# Patient Record
Sex: MALE | Race: WHITE | NOT HISPANIC OR LATINO | Employment: OTHER | URBAN - METROPOLITAN AREA
[De-identification: names, ages, dates, MRNs, and addresses within clinical notes are randomized per-mention and may not be internally consistent; named-entity substitution may affect disease eponyms.]

---

## 2017-01-30 ENCOUNTER — HOSPITAL ENCOUNTER (EMERGENCY)
Facility: HOSPITAL | Age: 27
Discharge: LEFT BEFORE DISCHARGE | End: 2017-01-30

## 2017-01-30 VITALS
BODY MASS INDEX: 23.62 KG/M2 | TEMPERATURE: 99 F | WEIGHT: 194 LBS | OXYGEN SATURATION: 100 % | SYSTOLIC BLOOD PRESSURE: 112 MMHG | HEIGHT: 76 IN | DIASTOLIC BLOOD PRESSURE: 80 MMHG | HEART RATE: 70 BPM | RESPIRATION RATE: 18 BRPM

## 2017-01-30 PROCEDURE — 93005 ELECTROCARDIOGRAM TRACING: CPT

## 2017-01-30 PROCEDURE — 99900041 HC LEFT WITHOUT BEING SEEN- EMERGENCY

## 2017-04-04 ENCOUNTER — HOSPITAL ENCOUNTER (EMERGENCY)
Facility: HOSPITAL | Age: 27
Discharge: HOME | End: 2017-04-04
Attending: EMERGENCY MEDICINE
Payer: MEDICAID

## 2017-04-04 VITALS
BODY MASS INDEX: 23.75 KG/M2 | OXYGEN SATURATION: 98 % | HEART RATE: 61 BPM | SYSTOLIC BLOOD PRESSURE: 125 MMHG | DIASTOLIC BLOOD PRESSURE: 84 MMHG | HEIGHT: 76 IN | WEIGHT: 195 LBS | TEMPERATURE: 98 F | RESPIRATION RATE: 16 BRPM

## 2017-04-04 DIAGNOSIS — R10.13 EPIGASTRIC PAIN: Primary | ICD-10-CM

## 2017-04-04 DIAGNOSIS — R06.02 SHORTNESS OF BREATH: ICD-10-CM

## 2017-04-04 DIAGNOSIS — J18.9 PNEUMONIA OF RIGHT LUNG DUE TO INFECTIOUS ORGANISM, UNSPECIFIED PART OF LUNG: ICD-10-CM

## 2017-04-04 LAB
ALBUMIN SERPL BCP-MCNC: 4.1 G/DL
ALP SERPL-CCNC: 70 U/L
ALT SERPL W/O P-5'-P-CCNC: 18 U/L
AMORPH CRY URNS QL MICRO: NORMAL
ANION GAP SERPL CALC-SCNC: 7 MMOL/L
AST SERPL-CCNC: 21 U/L
BASOPHILS # BLD AUTO: 0.05 K/UL
BASOPHILS NFR BLD: 0.4 %
BILIRUB SERPL-MCNC: 0.5 MG/DL
BILIRUB UR QL STRIP: NEGATIVE
BUN SERPL-MCNC: 13 MG/DL
CALCIUM SERPL-MCNC: 9.5 MG/DL
CHLORIDE SERPL-SCNC: 104 MMOL/L
CLARITY UR: ABNORMAL
CO2 SERPL-SCNC: 28 MMOL/L
COLOR UR: YELLOW
CREAT SERPL-MCNC: 1.2 MG/DL
DIFFERENTIAL METHOD: ABNORMAL
EOSINOPHIL # BLD AUTO: 0.8 K/UL
EOSINOPHIL NFR BLD: 6.5 %
ERYTHROCYTE [DISTWIDTH] IN BLOOD BY AUTOMATED COUNT: 13.6 %
EST. GFR  (AFRICAN AMERICAN): >60 ML/MIN/1.73 M^2
EST. GFR  (NON AFRICAN AMERICAN): >60 ML/MIN/1.73 M^2
GLUCOSE SERPL-MCNC: 92 MG/DL
GLUCOSE UR QL STRIP: NEGATIVE
HCT VFR BLD AUTO: 43.4 %
HGB BLD-MCNC: 14.6 G/DL
HGB UR QL STRIP: NEGATIVE
KETONES UR QL STRIP: NEGATIVE
LEUKOCYTE ESTERASE UR QL STRIP: NEGATIVE
LIPASE SERPL-CCNC: 32 U/L
LYMPHOCYTES # BLD AUTO: 4.1 K/UL
LYMPHOCYTES NFR BLD: 34.3 %
MCH RBC QN AUTO: 26.3 PG
MCHC RBC AUTO-ENTMCNC: 33.6 %
MCV RBC AUTO: 78 FL
MICROSCOPIC COMMENT: NORMAL
MONOCYTES # BLD AUTO: 1.2 K/UL
MONOCYTES NFR BLD: 9.8 %
NEUTROPHILS # BLD AUTO: 5.8 K/UL
NEUTROPHILS NFR BLD: 48.7 %
NITRITE UR QL STRIP: NEGATIVE
PH UR STRIP: 8 [PH] (ref 5–8)
PLATELET # BLD AUTO: 161 K/UL
PMV BLD AUTO: 11.4 FL
POTASSIUM SERPL-SCNC: 4.4 MMOL/L
PROT SERPL-MCNC: 7 G/DL
PROT UR QL STRIP: NEGATIVE
RBC # BLD AUTO: 5.56 M/UL
RBC #/AREA URNS HPF: 0 /HPF (ref 0–4)
SODIUM SERPL-SCNC: 139 MMOL/L
SP GR UR STRIP: 1.01 (ref 1–1.03)
URN SPEC COLLECT METH UR: ABNORMAL
UROBILINOGEN UR STRIP-ACNC: NEGATIVE EU/DL
WBC # BLD AUTO: 12 K/UL
WBC #/AREA URNS HPF: 0 /HPF (ref 0–5)

## 2017-04-04 PROCEDURE — 85025 COMPLETE CBC W/AUTO DIFF WBC: CPT

## 2017-04-04 PROCEDURE — 63600175 PHARM REV CODE 636 W HCPCS: Performed by: PHYSICIAN ASSISTANT

## 2017-04-04 PROCEDURE — 96374 THER/PROPH/DIAG INJ IV PUSH: CPT

## 2017-04-04 PROCEDURE — 96375 TX/PRO/DX INJ NEW DRUG ADDON: CPT

## 2017-04-04 PROCEDURE — 80053 COMPREHEN METABOLIC PANEL: CPT

## 2017-04-04 PROCEDURE — 99285 EMERGENCY DEPT VISIT HI MDM: CPT | Mod: 25

## 2017-04-04 PROCEDURE — 25000003 PHARM REV CODE 250: Performed by: PHYSICIAN ASSISTANT

## 2017-04-04 PROCEDURE — 25500020 PHARM REV CODE 255: Performed by: EMERGENCY MEDICINE

## 2017-04-04 PROCEDURE — 93005 ELECTROCARDIOGRAM TRACING: CPT

## 2017-04-04 PROCEDURE — 83690 ASSAY OF LIPASE: CPT

## 2017-04-04 PROCEDURE — 81000 URINALYSIS NONAUTO W/SCOPE: CPT

## 2017-04-04 RX ORDER — ONDANSETRON 2 MG/ML
4 INJECTION INTRAMUSCULAR; INTRAVENOUS
Status: COMPLETED | OUTPATIENT
Start: 2017-04-04 | End: 2017-04-04

## 2017-04-04 RX ORDER — DOXYCYCLINE HYCLATE 100 MG
100 TABLET ORAL
Status: COMPLETED | OUTPATIENT
Start: 2017-04-04 | End: 2017-04-04

## 2017-04-04 RX ORDER — DOXYCYCLINE 100 MG/1
100 CAPSULE ORAL EVERY 12 HOURS
Qty: 20 CAPSULE | Refills: 0 | Status: SHIPPED | OUTPATIENT
Start: 2017-04-04 | End: 2017-04-04 | Stop reason: ALTCHOICE

## 2017-04-04 RX ORDER — AZITHROMYCIN 500 MG/1
500 TABLET, FILM COATED ORAL DAILY
Qty: 5 TABLET | Refills: 0 | Status: SHIPPED | OUTPATIENT
Start: 2017-04-04 | End: 2017-04-09

## 2017-04-04 RX ORDER — PANTOPRAZOLE SODIUM 40 MG/1
40 TABLET, DELAYED RELEASE ORAL DAILY
Qty: 30 TABLET | Refills: 0 | Status: SHIPPED | OUTPATIENT
Start: 2017-04-04 | End: 2018-07-20

## 2017-04-04 RX ORDER — HYDROMORPHONE HYDROCHLORIDE 2 MG/ML
0.25 INJECTION, SOLUTION INTRAMUSCULAR; INTRAVENOUS; SUBCUTANEOUS
Status: COMPLETED | OUTPATIENT
Start: 2017-04-04 | End: 2017-04-04

## 2017-04-04 RX ORDER — ONDANSETRON 4 MG/1
4 TABLET, ORALLY DISINTEGRATING ORAL EVERY 8 HOURS PRN
Qty: 9 TABLET | Refills: 0 | Status: SHIPPED | OUTPATIENT
Start: 2017-04-04 | End: 2018-07-20

## 2017-04-04 RX ADMIN — HYDROMORPHONE HYDROCHLORIDE 0.25 MG: 2 INJECTION INTRAMUSCULAR; INTRAVENOUS; SUBCUTANEOUS at 01:04

## 2017-04-04 RX ADMIN — IOHEXOL 80 ML: 350 INJECTION, SOLUTION INTRAVENOUS at 02:04

## 2017-04-04 RX ADMIN — ONDANSETRON 4 MG: 2 INJECTION INTRAMUSCULAR; INTRAVENOUS at 01:04

## 2017-04-04 RX ADMIN — DOXYCYCLINE HYCLATE 100 MG: 100 TABLET, COATED ORAL at 04:04

## 2017-04-04 NOTE — ED AVS SNAPSHOT
[facility]  [place] [address]               Stephanie Singh   2017 12:51 AM   ED    Description:  Male : 1990   Department:  [facility]           Your Care was Coordinated By:     Provider Role From To    Caitlyn Forte MD Attending Provider 17 0059 --    EDITH Randolph Physician Assistant 17 0058 --      Reason for Visit     Abdominal Pain           Diagnoses this Visit        Comments    Epigastric pain    -  Primary     Shortness of breath         Pneumonia of right lung due to infectious organism, unspecified part of lung           ED Disposition     ED Disposition Condition Comment    Discharge             To Do List           Follow-up Information     Follow up with Gustavo Huggins MD.    Specialty:  Internal Medicine    Why:  Follow up with primary care within 3 days.  Call to schedule an appointment.    Contact information:    [address] [facility] [address] [phone]          Follow up with Nic Deal MD.    Specialty:  Gastroenterology    Why:  Follow up with gastroenterology within 2 days.  Call to schedule an appointment.    Contact information:    [address] [place] [address]  [address]  [facility]  [place] [address] [phone]         These Medications        Disp Refills Start End    pantoprazole (PROTONIX) 40 MG tablet 30 tablet 0 2017    Take 1 tablet (40 mg total) by mouth once daily. - Oral    ondansetron (ZOFRAN-ODT) 4 MG TbDL 9 tablet 0 2017     Take 1 tablet (4 mg total) by mouth every 8 (eight) hours as needed (nausea/vomiting). - Oral    azithromycin (ZITHROMAX) 500 MG tablet 5 tablet 0 2017    Take 1 tablet (500 mg total) by mouth once daily. - Oral      [facility]     [facility] -  Assistance  Unless otherwise directed by your provider, please contact [facility], our  nurse care line that is available for 24/7 assistance.     Registered nurses in the [facility] provide: appointment scheduling, clinical advisement, health education, and other advisory services.  Call: [phone] (toll free)               Medications           Message regarding Medications     Verify the changes and/or additions to your medication regime listed below are the same as discussed with your clinician today.  If any of these changes or additions are incorrect, please notify your healthcare provider.        START taking these NEW medications        Refills    pantoprazole (PROTONIX) 40 MG tablet 0    Sig: Take 1 tablet (40 mg total) by mouth once daily.    Class: Print    Route: Oral    ondansetron (ZOFRAN-ODT) 4 MG TbDL 0    Sig: Take 1 tablet (4 mg total) by mouth every 8 (eight) hours as needed (nausea/vomiting).    Class: Print    Route: Oral    azithromycin (ZITHROMAX) 500 MG tablet 0    Sig: Take 1 tablet (500 mg total) by mouth once daily.    Class: Print    Route: Oral      These medications were administered today        Dose Freq    ondansetron injection 4 mg 4 mg ED 1 Time    Sig: Inject 4 mg into the vein ED 1 Time.    Class: Normal    Route: Intravenous    hydromorphone (PF) injection 0.25 mg 0.25 mg ED 1 Time    Sig: Inject 0.125 mLs (0.25 mg total) into the vein ED 1 Time.    Class: Normal    Route: Intravenous    omnipaque 350 iohexol 80 mL 80 mL IMG once as needed    Sig: Inject 80 mLs into the vein ONCE PRN for contrast.    Class: Normal    Route: Intravenous    doxycycline tablet 100 mg 100 mg ED 1 Time    Sig: Take 1 tablet (100 mg total) by mouth ED 1 Time.    Class: Normal    Route: Oral           Verify that the below list of medications is an accurate representation of the medications you are currently taking.  If none reported, the list may be blank. If incorrect, please contact your healthcare provider. Carry this list with you in case of emergency.          "  Current Medications     azithromycin (ZITHROMAX) 500 MG tablet Take 1 tablet (500 mg total) by mouth once daily.    ondansetron (ZOFRAN-ODT) 4 MG TbDL Take 1 tablet (4 mg total) by mouth every 8 (eight) hours as needed (nausea/vomiting).    pantoprazole (PROTONIX) 40 MG tablet Take 1 tablet (40 mg total) by mouth once daily.           Clinical Reference Information           Your Vitals Were     BP Pulse Temp Resp Height Weight    125/84 (BP Location: Left arm, Patient Position: Sitting, BP Method: Automatic) 61 98.2 °F (36.8 °C) (Oral) 16 6' 4" (1.93 m) 88.5 kg (195 lb)    SpO2 BMI             98% 23.74 kg/m2         Allergies as of 4/4/2017     No Known Allergies      Immunizations Administered on Date of Encounter - 4/4/2017     None      ED Micro, Lab, POCT     Start Ordered       Status Ordering Provider    04/04/17 0129 04/04/17 0129  CBC W/ AUTO DIFFERENTIAL  Once      Final result     04/04/17 0129 04/04/17 0129  Comp. Metabolic Panel  STAT      Final result     04/04/17 0129 04/04/17 0129  Lipase  STAT      Final result     04/04/17 0129 04/04/17 0129  Urinalysis - Clean Catch  STAT      Final result     04/04/17 0129 04/04/17 0129  Urinalysis Microscopic  Once      Final result       ED Imaging Orders     Start Ordered       Status Ordering Provider    04/04/17 0130 04/04/17 0129  CT Abdomen Pelvis With Contrast  1 time imaging      Final result     04/04/17 0129 04/04/17 0129  X-ray chest PA and lateral  1 time imaging     Comments:  Abdominal pain and shortness of breath    Final result         Discharge Instructions       The patient is discharged to home.  You are to follow up as directed above.  Rest.  Avoid: spicy/fatty/greasy foods, alcohol, caffeine, citrus, NSAID medications, chocolate.  Return to the ED immediately for any new or worsening symptoms: fever, increased pain, worsening shortness of breath, chest pain, black or bloody vomit/stool, weakness, dizziness, or any other " concerns.    Discharge References/Attachments     ADULT, PNEUMONIA (ENGLISH)    ABDOMINAL PAIN, UNKNOWN CAUSE, (MALE) (ENGLISH)    SHORTNESS OF BREATH (DYSPNEA) (ENGLISH)      [facility] Sign-Up     Activating your [facility] account is as easy as 1-2-3!     1) Visit [email or web address], select Sign Up Now, enter this activation code and your date of birth, then select Next.  [email or web address]  Expires: 5/19/2017  4:01 AM      2) Create a username and password to use when you visit [facility] in the future and select a security question in case you lose your password and select Next.    3) Enter your e-mail address and click Sign Up!    Additional Information  If you have questions, please e-mail [email or web address] or call [phone] to talk to our [facility] staff. Remember, [facility] is NOT to be used for urgent needs. For medical emergencies, dial 911.         Smoking Cessation     If you would like to quit smoking:   You may be eligible for free services if you are a [place] resident and started smoking cigarettes before September 1, 1988.  Call the Smoking Cessation Trust ([facility]) toll free at [phone] or [phone].   Call 1-800-QUIT-NOW if you do not meet the above criteria.   Contact us via email: [email or web address]   View our website for more information: [email or web address]         [facility] complies with applicable Federal civil rights laws and does not discriminate on the basis of race, color, national origin, age, disability, or sex.        Language Assistance Services     ATTENTION: Language assistance services are available, free of charge. Please call [phone].      ATENCIÓN: Si habla español, tiene a adams disposición servicios gratuitos de asistencia lingüística. Llame al [phone].     CHÚ Ý: N?u b?n nói Ti?ng Vi?t, có các d?ch v? h? tr? ngôn ng? mi?n phí dành cho b?n. G?i s? [phone].

## 2017-04-04 NOTE — ED PROVIDER NOTES
Encounter Date: 4/4/2017       History     Chief Complaint   Patient presents with    Abdominal Pain     Pt states for the last two days he feels full and bloated and has reflux     Review of patient's allergies indicates:  No Known Allergies  HPI Comments: Historian:  Patient and wife, through the aid of Yazanchris onofre # [id number]  CC:  Shortness of breath  HPI:  This 26-year-old male presents to the emergency department complaining of a 10 month history of shortness of breath.  He has associated upper abdominal pain that radiates into his chest, nausea, and abdominal bloating.  He states his symptoms are aggravated by eating and drinking.  He has had some relief with Nexium and Gas-X, however these medications do not seem to be working as well as they initially were.  His pain is 7/10.  The patient denies fever, cough, black or bloody vomit or stool, constipation, vomiting, diarrhea, dysuria, hematuria, and increased urinary frequency.    History reviewed. No pertinent past medical history.  History reviewed. No pertinent surgical history.  History reviewed. No pertinent family history.  Social History   Substance Use Topics    Smoking status: Current Every Day Smoker     Packs/day: 1.00     Years: 15.00     Types: Cigarettes    Smokeless tobacco: None    Alcohol use Yes     Review of Systems   Constitutional: Negative for fever.   HENT: Negative for congestion and trouble swallowing.    Respiratory: Positive for shortness of breath. Negative for cough.    Cardiovascular: Positive for chest pain.   Gastrointestinal: Positive for abdominal pain and nausea. Negative for diarrhea and vomiting.   Genitourinary: Negative for dysuria, frequency and hematuria.   Musculoskeletal: Negative for gait problem.   Skin: Negative for rash.   Allergic/Immunologic: Negative for immunocompromised state.   Neurological: Negative for seizures.   Psychiatric/Behavioral: Negative for confusion.       Physical Exam   Initial Vitals    BP Pulse Resp Temp SpO2   04/04/17 0004 04/04/17 0004 04/04/17 0004 04/04/17 0004 04/04/17 0004   138/71 94 18 97.9 °F (36.6 °C) 98 %     Physical Exam    Constitutional: He appears well-developed and well-nourished. He is cooperative.  Non-toxic appearance. No distress.   HENT:   Head: Normocephalic and atraumatic.   Mouth/Throat: Mucous membranes are normal. No trismus in the jaw.   Neck: Trachea normal, normal range of motion, full passive range of motion without pain and phonation normal. Neck supple. No stridor present. No rigidity.   No meningismus.   Cardiovascular: Normal rate, regular rhythm and normal heart sounds. Exam reveals no gallop.    Pulmonary/Chest: Effort normal and breath sounds normal. No tachypnea. No respiratory distress. He has no decreased breath sounds. He has no wheezes. He has no rhonchi. He has no rales.   Abdominal: Soft. Normal appearance and bowel sounds are normal. There is tenderness in the epigastric area. There is no rigidity, no rebound, no guarding, no CVA tenderness, no tenderness at McBurney's point and negative Arguelles's sign.   Neurological: He is alert and oriented to person, place, and time. He has normal strength. No sensory deficit.   Skin: Skin is warm, dry and intact. No rash noted.         ED Course   Procedures  Labs Reviewed   CBC W/ AUTO DIFFERENTIAL - Abnormal; Notable for the following:        Result Value    MCV 78 (*)     MCH 26.3 (*)     Mono # 1.2 (*)     Eos # 0.8 (*)     All other components within normal limits   COMPREHENSIVE METABOLIC PANEL - Abnormal; Notable for the following:     Anion Gap 7 (*)     All other components within normal limits   URINALYSIS - Abnormal; Notable for the following:     Appearance, UA Cloudy (*)     All other components within normal limits   LIPASE   URINALYSIS MICROSCOPIC     EKG Readings: (Independently Interpreted)   01:33: Normal sinus rhythm, heart rate 68.  R axis.  Normal intervals.  No STEMI.             Additional  MDM:   Comments: Patient with a 10 month history of shortness of breath, chest pain, and epigastric abdominal pain.  He is afebrile and nontoxic-appearing with a supple neck and no meningismus.  His lungs are clear to auscultation bilaterally and he is not hypoxic or in any respiratory distress.  He has epigastric tenderness to palpation.  Urinalysis negative for blood and infection.  CBC unremarkable - no leukocytosis or significant anemia.  CMP unremarkable - no renal insufficiency, transaminitis, or significant electrolyte imbalance.  Lipase is within normal limits.  EKG independently reviewed and interpreted by Dr. Forte as normal sinus rhythm without ischemia or malignant arrhythmia.  Chest radiographs independently reviewed and interpreted by Dr. Forte and myself as no pleural effusion, cardiomegaly, or pneumothorax.  Radiology comments on possible nodular opacity in the right lung concerning for pneumonia.  Patient does not have a cough and his symptoms have been present for 10 months.  Will treat with azithromycin for possible pneumonia.  CT scan of abdomen and pelvis does not demonstrate evidence of bowel obstruction, bowel perforation, pancreas abnormality, gallbladder abnormality, appendicitis, intra-abdominal abscess, diverticulitis.  Patient did improve in the emergency department with IV fluids, Zofran, and Dilaudid.  I doubt meningitis, sepsis, pulmonary embolism, acute coronary syndrome, pancreatitis.  He could have ulcer disease.  Will start on Protonix.  Will prescribe Zofran.  He is to closely follow-up with primary care and gastroenterology.  Careful ED warnings and return instructions given.  This patient's case was discussed with Dr. Forte, she is in agreement with the assessment and plan..            Attending Attestation:     Physician Attestation Statement for NP/PA:   I discussed this assessment and plan of this patient with the NP/PA, but I did not personally examine the  patient. The face to face encounter was performed by the NP/PA.    Other NP/PA Attestation Additions:    History of Present Illness: 26-year-old male with 10 months of cough, chest pain, epigastric pain, bloating.   Physical Exam: Vital stable.   Medical Decision Making: Differential broad including atypical infectious disease, gastritis, pancreatitis, peptic ulcer, other.  ED workup overall reassuring.  CBC within normal.  CMP and lipase within normal.  UA without infection.  Chest x-ray read by radiology as questionable nodular opacities in the right lung, overall unimpressive, but treating with azithromycin.  CT abdomen and pelvis are also reassuring.  Patient will be discharged on Protonix and Zofran with antibiotics.  Follow-up to PMD encouraged.                 ED Course     Clinical Impression:   The primary encounter diagnosis was Epigastric pain. Diagnoses of Shortness of breath and Pneumonia of right lung due to infectious organism, unspecified part of lung were also pertinent to this visit.          EDITH Randolph  04/04/17 0603       EDITH Randolph  04/04/17 0604       Caitlyn Forte MD  04/04/17 0632

## 2017-04-04 NOTE — ED TRIAGE NOTES
Through the Patients wife, Demetris, states that he has very severe bloating with sob and cp. Also, pt feels constipated and states that the last time a bm was this morning. Pt has taken gas x and nexium to help relieve his pain and bloating.   Patient states to have  Nausea but denies vomiting.  Pt  States this has been going on since July.

## 2017-04-04 NOTE — DISCHARGE INSTRUCTIONS
The patient is discharged to home.  You are to follow up as directed above.  Rest.  Avoid: spicy/fatty/greasy foods, alcohol, caffeine, citrus, NSAID medications, chocolate.  Return to the ED immediately for any new or worsening symptoms: fever, increased pain, worsening shortness of breath, chest pain, black or bloody vomit/stool, weakness, dizziness, or any other concerns.

## 2018-07-20 ENCOUNTER — HOSPITAL ENCOUNTER (OUTPATIENT)
Facility: HOSPITAL | Age: 28
Discharge: HOME | End: 2018-07-21
Attending: EMERGENCY MEDICINE | Admitting: SURGERY

## 2018-07-20 DIAGNOSIS — K35.80 ACUTE APPENDICITIS, UNSPECIFIED ACUTE APPENDICITIS TYPE: Primary | ICD-10-CM

## 2018-07-20 LAB
ABO + RH BLD: NORMAL
ALBUMIN SERPL BCP-MCNC: 4.6 G/DL
ALP SERPL-CCNC: 90 U/L
ALT SERPL W/O P-5'-P-CCNC: 12 U/L
ANION GAP SERPL CALC-SCNC: 11 MMOL/L
AST SERPL-CCNC: 17 U/L
BASOPHILS # BLD AUTO: 0.01 K/UL
BASOPHILS NFR BLD: 0.1 %
BILIRUB SERPL-MCNC: 0.8 MG/DL
BLD GP AB SCN CELLS X3 SERPL QL: NORMAL
BUN SERPL-MCNC: 11 MG/DL
CALCIUM SERPL-MCNC: 10 MG/DL
CHLORIDE SERPL-SCNC: 103 MMOL/L
CO2 SERPL-SCNC: 25 MMOL/L
CREAT SERPL-MCNC: 1.1 MG/DL
DIFFERENTIAL METHOD: ABNORMAL
EOSINOPHIL # BLD AUTO: 0.2 K/UL
EOSINOPHIL NFR BLD: 1.8 %
ERYTHROCYTE [DISTWIDTH] IN BLOOD BY AUTOMATED COUNT: 14 %
EST. GFR  (AFRICAN AMERICAN): >60 ML/MIN/1.73 M^2
EST. GFR  (NON AFRICAN AMERICAN): >60 ML/MIN/1.73 M^2
GLUCOSE SERPL-MCNC: 92 MG/DL
HCT VFR BLD AUTO: 45.4 %
HGB BLD-MCNC: 15.6 G/DL
LIPASE SERPL-CCNC: 12 U/L
LYMPHOCYTES # BLD AUTO: 2.1 K/UL
LYMPHOCYTES NFR BLD: 15.8 %
MCH RBC QN AUTO: 26.9 PG
MCHC RBC AUTO-ENTMCNC: 34.4 G/DL
MCV RBC AUTO: 78 FL
MONOCYTES # BLD AUTO: 1.4 K/UL
MONOCYTES NFR BLD: 10.5 %
NEUTROPHILS # BLD AUTO: 9.3 K/UL
NEUTROPHILS NFR BLD: 71.2 %
PLATELET # BLD AUTO: 200 K/UL
PMV BLD AUTO: 11.9 FL
POTASSIUM SERPL-SCNC: 3.9 MMOL/L
PROT SERPL-MCNC: 8.2 G/DL
RBC # BLD AUTO: 5.81 M/UL
SODIUM SERPL-SCNC: 139 MMOL/L
WBC # BLD AUTO: 13.03 K/UL

## 2018-07-20 PROCEDURE — 86850 RBC ANTIBODY SCREEN: CPT

## 2018-07-20 PROCEDURE — 96367 TX/PROPH/DG ADDL SEQ IV INF: CPT

## 2018-07-20 PROCEDURE — 80053 COMPREHEN METABOLIC PANEL: CPT

## 2018-07-20 PROCEDURE — 96361 HYDRATE IV INFUSION ADD-ON: CPT

## 2018-07-20 PROCEDURE — 96375 TX/PRO/DX INJ NEW DRUG ADDON: CPT

## 2018-07-20 PROCEDURE — 99285 EMERGENCY DEPT VISIT HI MDM: CPT | Mod: 25

## 2018-07-20 PROCEDURE — 25000003 PHARM REV CODE 250: Performed by: NURSE PRACTITIONER

## 2018-07-20 PROCEDURE — 63600175 PHARM REV CODE 636 W HCPCS: Performed by: NURSE PRACTITIONER

## 2018-07-20 PROCEDURE — G0378 HOSPITAL OBSERVATION PER HR: HCPCS

## 2018-07-20 PROCEDURE — 85025 COMPLETE CBC W/AUTO DIFF WBC: CPT

## 2018-07-20 PROCEDURE — 83690 ASSAY OF LIPASE: CPT

## 2018-07-20 PROCEDURE — 25500020 PHARM REV CODE 255: Performed by: EMERGENCY MEDICINE

## 2018-07-20 PROCEDURE — 96366 THER/PROPH/DIAG IV INF ADDON: CPT

## 2018-07-20 PROCEDURE — 96365 THER/PROPH/DIAG IV INF INIT: CPT

## 2018-07-20 RX ORDER — HYDROMORPHONE HYDROCHLORIDE 2 MG/ML
0.2 INJECTION, SOLUTION INTRAMUSCULAR; INTRAVENOUS; SUBCUTANEOUS EVERY 4 HOURS PRN
Status: DISCONTINUED | OUTPATIENT
Start: 2018-07-20 | End: 2018-07-20

## 2018-07-20 RX ORDER — ONDANSETRON 2 MG/ML
4 INJECTION INTRAMUSCULAR; INTRAVENOUS
Status: COMPLETED | OUTPATIENT
Start: 2018-07-20 | End: 2018-07-20

## 2018-07-20 RX ORDER — SODIUM CHLORIDE 9 MG/ML
INJECTION, SOLUTION INTRAVENOUS CONTINUOUS
Status: DISCONTINUED | OUTPATIENT
Start: 2018-07-20 | End: 2018-07-21

## 2018-07-20 RX ORDER — HYDROMORPHONE HYDROCHLORIDE 2 MG/ML
1 INJECTION, SOLUTION INTRAMUSCULAR; INTRAVENOUS; SUBCUTANEOUS
Status: DISCONTINUED | OUTPATIENT
Start: 2018-07-20 | End: 2018-07-21 | Stop reason: HOSPADM

## 2018-07-20 RX ORDER — ONDANSETRON 2 MG/ML
4 INJECTION INTRAMUSCULAR; INTRAVENOUS EVERY 8 HOURS PRN
Status: DISCONTINUED | OUTPATIENT
Start: 2018-07-20 | End: 2018-07-21 | Stop reason: HOSPADM

## 2018-07-20 RX ORDER — HYDROMORPHONE HYDROCHLORIDE 2 MG/ML
0.25 INJECTION, SOLUTION INTRAMUSCULAR; INTRAVENOUS; SUBCUTANEOUS
Status: COMPLETED | OUTPATIENT
Start: 2018-07-20 | End: 2018-07-20

## 2018-07-20 RX ADMIN — SODIUM CHLORIDE: 0.9 INJECTION, SOLUTION INTRAVENOUS at 10:07

## 2018-07-20 RX ADMIN — PIPERACILLIN AND TAZOBACTAM 4.5 G: 4; .5 INJECTION, POWDER, LYOPHILIZED, FOR SOLUTION INTRAVENOUS; PARENTERAL at 08:07

## 2018-07-20 RX ADMIN — IOHEXOL 100 ML: 350 INJECTION, SOLUTION INTRAVENOUS at 06:07

## 2018-07-20 RX ADMIN — SODIUM CHLORIDE 1000 ML: 0.9 INJECTION, SOLUTION INTRAVENOUS at 05:07

## 2018-07-20 RX ADMIN — SODIUM CHLORIDE 1000 ML: 0.9 INJECTION, SOLUTION INTRAVENOUS at 08:07

## 2018-07-20 RX ADMIN — HYDROMORPHONE HYDROCHLORIDE 0.25 MG: 2 INJECTION, SOLUTION INTRAMUSCULAR; INTRAVENOUS; SUBCUTANEOUS at 05:07

## 2018-07-20 RX ADMIN — ONDANSETRON 4 MG: 2 INJECTION INTRAMUSCULAR; INTRAVENOUS at 05:07

## 2018-07-20 NOTE — ED PROVIDER NOTES
Encounter Date: 7/20/2018     This is a 28 y.o. male complaining of upper abdominal pain than began last night. Reports associated lightheadedness. Denies N/V/D.    I have evaluated and conducted a medical screening exam with initial orders entered, if indicated, to expedite care. The patient will be placed in a treatment area when one is available. Care will be transferred to an alternate provider for a full assessment including but not limited to: history, physical exam, additional orders, and final disposition.    Coleman Jimenez NP      SCRIBE #1 NOTE: I, Elliott Mendes, am scribing for, and in the presence of,  Galina De La Rosa NP. I have scribed the following portions of the note - Other sections scribed: HPI, ROS.       History     Chief Complaint   Patient presents with    Abdominal Pain     Pt reports light headed and gas pain started yesterday,  Pt denies NVFD    Weakness     CC: Abdominal Pain    HPI: This is a 28 y.o. M with no pertinent medical history who presents to the ED c/o acute generalized abdominal pain that began yesterday. He describes the abdominal pain as a stabbing pain. Pt has associated constipation. His last normal BM was 1 hour PTA, but before that his last BM was 2 days ago. He has been taking laxatives with some or no relief. His last meal was yogurt at 11:00am. Pt was evaluated at this ED for previous episode of abdominal pain. Current episode of abdominal pain is not similar to previous episode. The abdominal pain radiated to the chest and was related to acid reflux during previous episode. Pt states that he avoids eating fried food, spicy food, and dairy products. No new change in diet. He does not take Zantac or Protonix. He was referred to a Gastroenterologist during his last visit, but did not follow up due to having Medicaid. Pt denies blood in stool, dark/tarry stool, diarrhea, nausea, vomiting, fever, CP, dysuria, or difficulty urinating.      The history is provided by the  patient. The history is limited by a language barrier. A  was used (Martti and wife used for interpretation).     Review of patient's allergies indicates:  No Known Allergies  No past medical history on file.  No past surgical history on file.  No family history on file.  Social History   Substance Use Topics    Smoking status: Current Every Day Smoker     Packs/day: 1.00     Years: 15.00     Types: Cigarettes    Smokeless tobacco: Not on file    Alcohol use Yes     Review of Systems   Constitutional: Negative for chills and fever.   HENT: Negative for ear pain and sore throat.    Eyes: Negative for pain.   Respiratory: Negative for cough and shortness of breath.    Cardiovascular: Negative for chest pain.   Gastrointestinal: Positive for abdominal pain and constipation. Negative for blood in stool, diarrhea, nausea and vomiting.        (-) Dark/tarry stool   Genitourinary: Negative for difficulty urinating and dysuria.   Musculoskeletal: Negative for back pain.   Skin: Negative for rash.   Neurological: Negative for headaches.       Physical Exam     Initial Vitals   BP Pulse Resp Temp SpO2   -- -- -- -- --      MAP       --         Physical Exam    Nursing note and vitals reviewed.  Constitutional: He appears well-developed and well-nourished.  Non-toxic appearance.   HENT:   Head: Normocephalic and atraumatic.   Right Ear: External ear normal.   Left Ear: External ear normal.   Nose: Nose normal.   Mouth/Throat: Oropharynx is clear and moist. No oropharyngeal exudate.   Eyes: Conjunctivae and EOM are normal. Pupils are equal, round, and reactive to light.   Neck: Full passive range of motion without pain. Neck supple.   Cardiovascular: Normal rate and normal pulses.   Pulmonary/Chest: Effort normal and breath sounds normal. No respiratory distress. He has no decreased breath sounds. He has no wheezes.   Abdominal: Soft. Bowel sounds are normal. There is no tenderness. There is no guarding.    Musculoskeletal: Normal range of motion.   Neurological: He is alert and oriented to person, place, and time. He has normal strength. Gait normal. GCS eye subscore is 4. GCS verbal subscore is 5. GCS motor subscore is 6.   Skin: Skin is warm, dry and intact. Capillary refill takes less than 2 seconds. No rash noted.   Psychiatric: He has a normal mood and affect. His speech is normal and behavior is normal. Judgment and thought content normal. Cognition and memory are normal.         ED Course   Procedures  Labs Reviewed - No data to display       Imaging Results    None          Medical Decision Making:   History:   Old Medical Records: I decided to obtain old medical records.  Differential Diagnosis:   GERD  PUD  Gastroenteritis  Cholecystitis  Pancreatitis  Appendicitis  Acute abdomen  Sepsis  Clinical Tests:   Lab Tests: Ordered and Reviewed  Radiological Study: Ordered and Reviewed  ED Management:  Symptoms improved following intravenous fluid resuscitation, Zofran, and pain medication administration.    No fever tachycardia noted.  There is significant right lower quadrant tenderness on exam.    I independently reviewed and interpreted labs which are notable for slightly elevated WBC.  All other labs are essentially within normal limits.    CT of abdomen demonstrates appendix on the upper end of normal and is concerning for early appendicitis.    Zosyn initiated in the ED.    Based on history and physical exam, as well as diagnostic results, I considered but do not suspect GERD, PUD, gastroenteritis, cholecystitis, pancreatitis, acute abdomen, or sepsis.  Clinical presentation suggests possible early appendicitis.  General surgery consulted.  The case discussed with case management who recommends placing patient in observation.  Case discussed with Dr. Iraheta who has accepted the patient.  Patient will be admitted for observation.  Admit orders placed.            Scribe Attestation:   Scribe #1: I performed  the above scribed service and the documentation accurately describes the services I performed. I attest to the accuracy of the note.    Attending Attestation:     Physician Attestation Statement for NP/PA:   I have conducted a face to face encounter with this patient in addition to the NP/PA, due to    Other NP/PA Attestation Additions:    History of Present Illness: Discussed with surgery for observation. Esequiel ZAMORA.  No signs of acute surgical abdomen         Physician Attestation for Scribe:  Physician Attestation Statement for Scribe #1: I, Galina De La Rosa NP, reviewed documentation, as scribed by Elliott Mendes in my presence, and it is both accurate and complete.         I, ANTHONY Mcrae, FNP-BC, AGACNP-BC, personally performed the services described in this documentation. All medical record entries made by the scribe were at my direction and in my presence. I have reviewed the chart and agree that the record reflects my personal performance and is accurate and complete. ANTHONY Mcrae, FNP-BC, AGACNP-BC 9:39 PM 07/20/2018           Clinical Impression:   The encounter diagnosis was Acute appendicitis, unspecified acute appendicitis type.                             Galina De La Rosa NP  07/20/18 2139       Jose Manning DO  07/20/18 2145

## 2018-07-20 NOTE — ED TRIAGE NOTES
"Sharp pains in epigastric region x 1 day. [place] "[place]. At 5 AM, he almost passed out, heart started racing, dizziness". C/o of pain currently epigastric, heart racing and blurry vision. Rates pain 8/10.   "

## 2018-07-21 VITALS
RESPIRATION RATE: 17 BRPM | HEART RATE: 60 BPM | TEMPERATURE: 98 F | DIASTOLIC BLOOD PRESSURE: 55 MMHG | BODY MASS INDEX: 25.29 KG/M2 | SYSTOLIC BLOOD PRESSURE: 113 MMHG | WEIGHT: 207.69 LBS | OXYGEN SATURATION: 96 % | HEIGHT: 76 IN

## 2018-07-21 PROBLEM — K35.80 ACUTE APPENDICITIS: Status: RESOLVED | Noted: 2018-07-20 | Resolved: 2018-07-21

## 2018-07-21 LAB
ALBUMIN SERPL BCP-MCNC: 3.4 G/DL
ALP SERPL-CCNC: 64 U/L
ALT SERPL W/O P-5'-P-CCNC: 11 U/L
ANION GAP SERPL CALC-SCNC: 4 MMOL/L
AST SERPL-CCNC: 12 U/L
BASOPHILS # BLD AUTO: 0.02 K/UL
BASOPHILS NFR BLD: 0.2 %
BILIRUB SERPL-MCNC: 0.8 MG/DL
BUN SERPL-MCNC: 9 MG/DL
CALCIUM SERPL-MCNC: 8 MG/DL
CHLORIDE SERPL-SCNC: 107 MMOL/L
CO2 SERPL-SCNC: 27 MMOL/L
CREAT SERPL-MCNC: 1 MG/DL
DIFFERENTIAL METHOD: ABNORMAL
EOSINOPHIL # BLD AUTO: 0.3 K/UL
EOSINOPHIL NFR BLD: 3.3 %
ERYTHROCYTE [DISTWIDTH] IN BLOOD BY AUTOMATED COUNT: 14 %
EST. GFR  (AFRICAN AMERICAN): >60 ML/MIN/1.73 M^2
EST. GFR  (NON AFRICAN AMERICAN): >60 ML/MIN/1.73 M^2
GLUCOSE SERPL-MCNC: 98 MG/DL
HCT VFR BLD AUTO: 38.1 %
HGB BLD-MCNC: 12.8 G/DL
LYMPHOCYTES # BLD AUTO: 3.1 K/UL
LYMPHOCYTES NFR BLD: 34.8 %
MCH RBC QN AUTO: 26.8 PG
MCHC RBC AUTO-ENTMCNC: 33.6 G/DL
MCV RBC AUTO: 80 FL
MONOCYTES # BLD AUTO: 1.1 K/UL
MONOCYTES NFR BLD: 11.6 %
NEUTROPHILS # BLD AUTO: 4.5 K/UL
NEUTROPHILS NFR BLD: 49.7 %
PLATELET # BLD AUTO: 158 K/UL
PMV BLD AUTO: 11.7 FL
POTASSIUM SERPL-SCNC: 4.7 MMOL/L
PROT SERPL-MCNC: 5.9 G/DL
RBC # BLD AUTO: 4.78 M/UL
SODIUM SERPL-SCNC: 138 MMOL/L
WBC # BLD AUTO: 9.03 K/UL

## 2018-07-21 PROCEDURE — 80053 COMPREHEN METABOLIC PANEL: CPT

## 2018-07-21 PROCEDURE — 85025 COMPLETE CBC W/AUTO DIFF WBC: CPT

## 2018-07-21 PROCEDURE — 36415 COLL VENOUS BLD VENIPUNCTURE: CPT

## 2018-07-21 PROCEDURE — 25000003 PHARM REV CODE 250: Performed by: NURSE PRACTITIONER

## 2018-07-21 PROCEDURE — G0378 HOSPITAL OBSERVATION PER HR: HCPCS

## 2018-07-21 RX ADMIN — SODIUM CHLORIDE: 0.9 INJECTION, SOLUTION INTRAVENOUS at 06:07

## 2018-07-21 NOTE — PLAN OF CARE
"Problem: Patient Care Overview  Goal: Plan of Care Review  Outcome: Ongoing (interventions implemented as appropriate)   07/21/18 0339   Coping/Psychosocial   Plan Of Care Reviewed With patient;spouse   Pt is AAOx4. Pt c/o abdominal pain but refuses medicine. Wife states pt "does not really take medicine at home". Pt remains free from falls. Pt ambulates to restroom. Educated to call for stand-by assistance when ambulating to prevent falls. No distress noted. Will continue to monitor. Safety maintained.       "

## 2018-07-21 NOTE — PROGRESS NOTES
WRITTEN DISCHARGE INFORMATION:     Follow-up Information     New Aldridge MD.    Specialty:  General Surgery  Why:  As needed  Contact information:  [address] [facility]  [address] [place] [address] [id number] [phone]             Bobby Major MD. Schedule an appointment as soon as possible for a visit in 1 week.    Specialty:  Family Medicine  Contact information:  [address] [facility] [address]  [phone]               Things that YOU are responsible for to Manage Your Care At Home:  1. Getting your prescriptions filled.  2. Taking you medications as directed. DO NOT MISS ANY DOSES!  3. Going to your follow-up doctor appointments. This is important because it allows the doctor to monitor your progress and to determine if any changes need to be made to your treatment plan.                                                         Help at Home  After discharge for assistance [facility] 24/7 assistance  [phone]     Thank you for choosing [facility] for your care.  Sincerely, Your [facility] Manager is,  Krista Robledo RN [facility] [phone]

## 2018-07-21 NOTE — PROGRESS NOTES
TN informed med surg nurse/Galina that pt is ready for d/c form [facility].Krista Robledo RN, BSN, [facility]  7/21/2018

## 2018-07-21 NOTE — CONSULTS
[facility]          General Surgery Consultation Report    07/20/2018   9:06 PM     Stephanie Singh  [id number]    Consultant: Dr. Aldridge    Consultation For: Appendicitis    HPI: Case of 28 y.o. who presents with 24h hx of abdominal pain. Pain started yesterday, dull, throughout that transitioned to right sided abdominal pain. Pain is much improved from yesterday. No associated N/V, tolerating a diet, and had a BM today at 1pm  CT in ED shows normal appendix at 7mm, no stranding or enhancement of the wall. There was no associated fevers at home.    SUBJECTIVE:                                                                                                     ROS: Complete ROS negative except those mentioned in the HPI    OBJECTIVE:                                                                                                     History reviewed. No pertinent past medical history.    History reviewed. No pertinent surgical history.    Social History     Social History    Marital status:      Spouse name: N/A    Number of children: N/A    Years of education: N/A     Occupational History    Not on file.     Social History Main Topics    Smoking status: Current Every Day Smoker     Packs/day: 1.00     Years: 15.00     Types: Cigarettes    Smokeless tobacco: Never Used    Alcohol use Yes    Drug use: No    Sexual activity: Not on file     Other Topics Concern    Not on file     Social History Narrative    No narrative on file       History reviewed. No pertinent family history.    Review of patient's allergies indicates:  No Known Allergies    Vitals:    07/20/18 2011   BP: 139/77   Pulse: 62   Resp:    Temp: 98.2 °F (36.8 °C)       Recent Labs      07/20/18   1732   WBC  13.03*   HGB  15.6   HCT  45.4   PLT  200        Recent Labs      07/20/18   1732   NA  139   K  3.9   CL  103   CO2  25   BUN  11   CREATININE  1.1   AST  17   ALT  12        Imaging Results          CT Abdomen  Pelvis With Contrast (Final result)  Result time 07/20/18 18:59:26    Final result by Rolando Tarango MD (07/20/18 18:59:26)                 Impression:      Appendix measures upper limits of normal in caliber.  No surrounding inflammatory changes are seen, however potential early appendicitis unable to be completely excluded given reported clinical history of right lower quadrant pain.  Clinical correlation is needed.      Electronically signed by: Rolando Tarango MD  Date:    07/20/2018  Time:    18:59             Narrative:    EXAMINATION:  CT ABDOMEN PELVIS WITH CONTRAST    CLINICAL HISTORY:  RLQ pain, appendicitis suspected;    TECHNIQUE:  Low dose axial images, sagittal and coronal reformations were obtained from the lung bases to the pubic symphysis following the IV administration of 80 mL of Omnipaque 350 .  Oral contrast was not given.    COMPARISON:  CT abdomen and pelvis from April 2017.    FINDINGS:  The visualized portion of the heart is unremarkable.  The lung bases are clear.    No significant hepatic abnormalities are identified.  There is no intra-or extrahepatic biliary ductal dilatation.  The gallbladder is unremarkable.  The stomach, pancreas, spleen, and adrenal glands are unremarkable.    Kidneys, ureters, urinary bladder, and prostate show no significant abnormalities.    Appendix measures upper limits of normal in caliber at 7 mm.  No surrounding inflammatory changes are seen.  The visualized loops of small and large bowel show no evidence of obstruction or inflammation.  No free air or free fluid.    Aorta tapers normally.    No acute osseous abnormality identified. Subcutaneous soft tissue structures are unremarkable.                                 Physical Exam:    GEN: AAOx3. NAD.   HEENT: NC. AT. EOMI. MOM.   RESP: No wheezing, breathing comfortably on room air  CV: RRR, normal cap refill  ABD: TTP right side of abdomen, no rebound/guarding. Soft non distended, mildly tympanitic  EXT:  Normal inspection, no edema  SKIN: Warm, dry, intact  NEURO: AAOx3  PSYCH: Normal affect    ASSESSMENT / PLAN:                                                                                      Case of 28 y.o. male with no pmhx who presents with abdominal pain that started yesterday, dull, that continued today and now associated with his right side. Pain is improved from yesterday. CT scan shows a normal appendix w/ out associated signs of inflammation.  Low concern for appendicitis considering improved abdominal pain, no N/V/diarrhea/fevers, and a normal appearing CT scan   - Will admit for obs, IVF, NPO  - No abx  - Re-evaluate in the morning       Sebastian Iraheta  Surgery, PGY III

## 2018-07-21 NOTE — DISCHARGE SUMMARY
[facility]  Discharge Summary      Admit Date: 2018    Discharge Date and Time:  2018 9:27 AM    Attending Physician: New Aldridge MD     Reason for Admission: possible appendicitis    Procedures Performed: * No surgery found *    Hospital Course (synopsis of major diagnoses, care, treatment, and services provided during the course of the hospital stay): Patient presented to ED with concerns for acute appendicitis. See full H&P for presentation. Patient was kept NPO at night, without abx and was much improved this morning without abdominal pain/N/V. WBC was also normal and there was no changes in vitals overnight     Consults: none    Significant Diagnostic Studies: Labs:   CMP   Recent Labs  Lab 18  1732 18  0626    138   K 3.9 4.7    107   CO2 25 27   GLU 92 98   BUN 11 9   CREATININE 1.1 1.0   CALCIUM 10.0 8.0*   PROT 8.2 5.9*   ALBUMIN 4.6 3.4*   BILITOT 0.8 0.8   ALKPHOS 90 64   AST 17 12   ALT 12 11   ANIONGAP 11 4*   ESTGFRAFRICA >60 >60   EGFRNONAA >60 >60    and CBC   Recent Labs  Lab 18  1732 18  0625   WBC 13.03* 9.03   HGB 15.6 12.8*   HCT 45.4 38.1*    158       Final Diagnoses:    Principal Problem: <principal problem not specified> RLQ pain   Secondary Diagnoses: n/a    Discharged Condition: good    Disposition: Home or Self Care    Follow Up/Patient Instructions: No follow up required      Medications:  None (patient  at medical facility)  No discharge procedures on file.

## 2018-07-21 NOTE — NURSING
Pt arrived via wheelchair. Wife at bedside. Pt ambulated to bed. Safety maintained: SRx2, bed in lowest position c wheels locked. Oriented to call light system and room orientation. Educated to call for stand-by assistance when ambulating to prevent falls.

## 2018-07-22 NOTE — PLAN OF CARE
07/21/18 1043   Discharge Assessment   Assessment Type Discharge Planning Assessment   Confirmed/corrected address and phone number on facesheet? Yes   Assessment information obtained from? Patient;Caregiver   Communicated expected length of stay with patient/caregiver no   Prior to hospitilization cognitive status: Alert/Oriented   Prior to hospitalization functional status: Independent   Current cognitive status: Alert/Oriented   Current Functional Status: Independent   Lives With spouse   Able to Return to Prior Arrangements no   Is patient able to care for self after discharge? Yes   Who are your caregiver(s) and their phone number(s)? (spouse, Priti Carrasquillo [phone])   Patient's perception of discharge disposition home or selfcare   Readmission Within The Last 30 Days no previous admission in last 30 days   Patient currently being followed by outpatient case management? No   Patient currently receives any other outside agency services? No   Equipment Currently Used at Home none   Do you have any problems affording any of your prescribed medications? No   Is the patient taking medications as prescribed? no   Does the patient have transportation home? Yes   Transportation Available car;family or friend will provide   Does the patient receive services at the [facility]? No   Discharge Plan A Home with family   Discharge Plan B Home with family   Patient/Family In Agreement With Plan yes   Does the patient have transportation to healthcare appointments? Yes

## 2020-04-04 ENCOUNTER — HOSPITAL ENCOUNTER (EMERGENCY)
Facility: HOSPITAL | Age: 30
Discharge: HOME | End: 2020-04-04
Attending: EMERGENCY MEDICINE
Payer: OTHER GOVERNMENT

## 2020-04-04 VITALS
HEIGHT: 76 IN | TEMPERATURE: 98 F | WEIGHT: 215 LBS | DIASTOLIC BLOOD PRESSURE: 76 MMHG | RESPIRATION RATE: 18 BRPM | OXYGEN SATURATION: 100 % | HEART RATE: 62 BPM | BODY MASS INDEX: 26.18 KG/M2 | SYSTOLIC BLOOD PRESSURE: 113 MMHG

## 2020-04-04 DIAGNOSIS — R06.02 SHORTNESS OF BREATH: ICD-10-CM

## 2020-04-04 DIAGNOSIS — Z20.822 SUSPECTED COVID-19 VIRUS INFECTION: Primary | ICD-10-CM

## 2020-04-04 LAB — SARS-COV-2 RNA AMPLIFICATION, QUAL: NEGATIVE

## 2020-04-04 PROCEDURE — 99284 EMERGENCY DEPT VISIT MOD MDM: CPT | Mod: 25

## 2020-04-04 PROCEDURE — U0002 COVID-19 LAB TEST NON-CDC: HCPCS

## 2020-04-04 RX ORDER — ACETAMINOPHEN 500 MG
500 TABLET ORAL EVERY 4 HOURS PRN
Qty: 30 TABLET | Refills: 0 | Status: SHIPPED | OUTPATIENT
Start: 2020-04-04 | End: 2020-10-08

## 2020-04-04 RX ORDER — GUAIFENESIN/DEXTROMETHORPHAN 100-10MG/5
5 SYRUP ORAL 4 TIMES DAILY PRN
Qty: 120 ML | Refills: 0 | Status: SHIPPED | OUTPATIENT
Start: 2020-04-04 | End: 2020-04-14

## 2020-04-04 RX ORDER — ALBUTEROL SULFATE 90 UG/1
1-2 AEROSOL, METERED RESPIRATORY (INHALATION) EVERY 6 HOURS PRN
Qty: 8 G | Refills: 0 | Status: SHIPPED | OUTPATIENT
Start: 2020-04-04 | End: 2020-10-08

## 2020-04-04 RX ORDER — BENZONATATE 100 MG/1
100 CAPSULE ORAL 3 TIMES DAILY PRN
Qty: 20 CAPSULE | Refills: 0 | Status: SHIPPED | OUTPATIENT
Start: 2020-04-04 | End: 2020-10-08

## 2020-04-04 NOTE — DISCHARGE INSTRUCTIONS
Isolate yourself at home away from others for 14 days.  Sleep in a separate bedroom and use a separate bathroom from anyone else in the house if possible.  Wear a mask at all times if you must be around others.  Cough into your elbow instead of your hand at all times.  Wash your hands for 20 seconds very frequently.    Take all medications as prescribed.  Take Tylenol for fevers as needed.    Return to the emergency department immediately for worsening shortness of breath/difficulty breathing.    Thank you for coming to our Emergency Department today. It is important to remember that some problems are difficult to diagnose and may not be found during your first visit. Be sure to follow up with your primary care doctor. Make sure to tell him/her that you may have COVID-19 when you call.  If you do not have one, you may contact the one listed on your discharge paperwork or you may also call the [facility]ointment [facility]k at [phone] to schedule an appointment with one.     Return to the ER with any questions/concerns, new/concerning symptoms, worsening or failure to improve. Do not drive or make any important decisions for 24 hours if you have received any pain medications, sedatives or mood altering drugs during your ER visit.

## 2020-04-04 NOTE — ED PROVIDER NOTES
Encounter Date: 4/4/2020    SCRIBE #1 NOTE: I, Aleksander Garcia, am scribing for, and in the presence of,  Coleman Jimenez NP. I have scribed the following portions of the note - Other sections scribed: HPI, ROS, PE.       History     Chief Complaint   Patient presents with    Cough     The patient reports a productive cough, sob, nasuea x 1 week.     Shortness of Breath     CC: Cough    HPI: This 29 y.o. Male with no pertinent past medical history presents to the emergency room with cough for the past 10 days. Pt has associated shortness of breath, malaise and generalized body aches that started 3 days ago. His cough worsened 3 days ago. Pt has intermittent shortness of breath worse with exertion. He denies fever, sore throat or chest pain. His wife had cough 2 days ago. No alleviating factors president.    The history is provided by the patient. No  was used.     Review of patient's allergies indicates:  No Known Allergies  History reviewed. No pertinent past medical history.  History reviewed. No pertinent surgical history.  History reviewed. No pertinent family history.  Social History     Tobacco Use    Smoking status: Current Every Day Smoker     Packs/day: 1.00     Years: 15.00     Pack years: 15.00     Types: Cigarettes    Smokeless tobacco: Never Used   Substance Use Topics    Alcohol use: Yes    Drug use: No     Review of Systems   Constitutional: Negative for fever.   HENT: Negative for sore throat.    Respiratory: Positive for cough and shortness of breath.    Cardiovascular: Negative for chest pain.   Genitourinary: Negative for dysuria.   Musculoskeletal: Positive for myalgias. Negative for back pain.   Skin: Negative for rash.   Neurological: Negative for weakness.   Hematological: Does not bruise/bleed easily.       Physical Exam     Initial Vitals [04/04/20 1050]   BP Pulse Resp Temp SpO2   (!) 141/82 102 18 98.3 °F (36.8 °C) 100 %      MAP       --         Physical  Exam    Vitals reviewed.  Constitutional: He appears well-developed and well-nourished. He is not diaphoretic. He is active and cooperative.  Non-toxic appearance. He does not have a sickly appearance. He does not appear ill. No distress.   HENT:   Head: Normocephalic and atraumatic.   Right Ear: Hearing and external ear normal.   Left Ear: Hearing and external ear normal.   Nose: Nose normal.   Mouth/Throat: Oropharynx is clear and moist.   Eyes: Conjunctivae and EOM are normal. Pupils are equal, round, and reactive to light. Right eye exhibits no discharge. Left eye exhibits no discharge.   Neck: Normal range of motion. Neck supple. No tracheal deviation present.   Cardiovascular: Normal rate and regular rhythm.   Pulmonary/Chest: Effort normal and breath sounds normal. No accessory muscle usage or stridor. No tachypnea. No respiratory distress.   Speaking in full clear sentences without difficulty.  Respiratory effort is normal.  No tachypnea.  Lungs are clear to auscultation bilaterally in all fields.   Abdominal: Soft. He exhibits no distension. There is no tenderness.   Musculoskeletal: Normal range of motion. He exhibits no edema or tenderness.   Neurological: He is alert and oriented to person, place, and time. He has normal strength. No cranial nerve deficit.   Skin: Skin is warm and dry. No rash noted.   Psychiatric: He has a normal mood and affect. His speech is normal and behavior is normal. Judgment and thought content normal.         ED Course   Procedures  Labs Reviewed   SARS-COV-2 RNA AMPLIFICATION, QUAL          Imaging Results          X-Ray Chest AP Portable (Final result)  Result time 04/04/20 12:16:41    Final result by Parisa Painting MD (04/04/20 12:16:41)                 Impression:      No acute abnormality.      Electronically signed by: Parisa Painting MD  Date:    04/04/2020  Time:    12:16             Narrative:    EXAMINATION:  XR CHEST AP PORTABLE    CLINICAL HISTORY:  Shortness  of breath    TECHNIQUE:  Single frontal view of the chest was performed.    COMPARISON:  April 4, 2017    FINDINGS:  The lungs are free of lobar consolidation and alveolar edema, with normal appearance of pulmonary vasculature and no large pleural effusion or pneumothorax.    The cardiac silhouette is normal in size. The hilar and mediastinal contours are unremarkable.    Bones are intact.                                 Medical Decision Making:   History:   Old Medical Records: I decided to obtain old medical records.  Clinical Tests:   Lab Tests: Ordered  Radiological Study: Ordered and Reviewed  ED Management:  DDx:  Influenza, viral syndrome, COVID-19, strep pharyngitis, viral pharyngitis, otitis media, sinusitis, pneumonia, bronchitis, meningitis, sepsis, others    HPI and physical exam as above.      The patient appears to have a viral respiratory infection.  Given the widespread activity of the COVID-19 pandemic in our area at this time it is a likely etiology of the patient's symptoms.  Based upon the history and physical exam the patient does not appear to have a serious bacterial infection such as sepsis, otitis media, bacterial sinusitis, strep pharyngitis, parapharyngeal or peritonsillar abscess, meningitis. COVID-19 rapid test was negative, however false negatives are possible and symptoms may still be due to COVID-19.  Chest x-ray shows no evidence of pneumonia.  Respiratory effort is normal. Lungs are clear to auscultation bilaterally in all fields. Mucous membranes are moist and the patient is tolerating P.O. without difficulty.  Patient is afebrile at this time but reports subjective fevers.  Patient is nontoxic, alert, active, and appears very well at this time just prior to discharge.  Room air oxygen saturation 100%.  I have given specific return precautions to the patient regarding dyspnea.  I will prescribe medications to treat the patient's symptoms.     The results and physical exam findings  were reviewed with the patient.  I advised the patient to remain home and self isolate from others for 2 weeks. The patient should wear a surgical mask at all times, especially if he must be around others.  Strict ED return precautions given concerning worsening shortness of breath/dyspnea. All questions regarding diagnosis and plan were answered to the patient's fullest possible satisfaction. Patient expressed understanding of diagnosis, discharge instructions, and return precautions.      I used full PPE and gloves while evaluating this patient. I also used a face shield and n95 while evaluating this patient. I cleaned my stethoscope before and after evaluating along with my hands.             Scribe Attestation:   Scribe #1: I performed the above scribed service and the documentation accurately describes the services I performed. I attest to the accuracy of the note.                          Clinical Impression:       ICD-10-CM ICD-9-CM   1. Suspected Covid-19 Virus Infection R68.89    2. Shortness of breath R06.02 786.05         Disposition:   Disposition: Discharged  Condition: Stable     ED Disposition Condition    Discharge Stable        ED Prescriptions     Medication Sig Dispense Start Date End Date Auth. Provider    acetaminophen (TYLENOL) 500 MG tablet Take 1 tablet (500 mg total) by mouth every 4 (four) hours as needed (Fever). 30 tablet 4/4/2020  Coleman Jimenez NP    albuterol (PROVENTIL/VENTOLIN HFA) 90 mcg/actuation inhaler Inhale 1-2 puffs into the lungs every 6 (six) hours as needed for Wheezing or Shortness of Breath. Rescue 8 g 4/4/2020  Coleman Jimenez NP    benzonatate (TESSALON) 100 MG capsule Take 1 capsule (100 mg total) by mouth 3 (three) times daily as needed for Cough. 20 capsule 4/4/2020  Coleman Jimenez NP    dextromethorphan-guaifenesin  mg/5 ml (ROBITUSSIN-DM)  mg/5 mL liquid Take 5 mLs by mouth 4 (four) times daily as needed (cough). 120 mL 4/4/2020 4/14/2020 Coleman CORLEY  WILLY Jimenez        Follow-up Information     Follow up With Specialties Details Why Contact Info    [facility] [place]  Call   [address] [place] [address]  [phone]      [facility] Go to  If symptoms worsen, As needed [address]  [place] [address] [phone]                      Zana attestation: I, Coleman Jimenez NP, personally performed the services described in this documentation. All medical record entries made by the scribe were at my direction and in my presence.  I have reviewed the chart and agree that the record reflects my personal performance and is accurate and complete.               Coleman Jimenez NP  04/04/20 6737

## 2020-04-05 ENCOUNTER — NURSE TRIAGE (OUTPATIENT)
Dept: ADMINISTRATIVE | Facility: CLINIC | Age: 30
End: 2020-04-05

## 2020-04-05 NOTE — TELEPHONE ENCOUNTER
Patient was called for symptom tracking program.    He was seen yesterday in the ED for shortness of breath and cough ans was evaluated for COVID-19. He tested negative for the rapid test. It was recommended he self-isolate for 14 days due to possibility of a false negative test result.    Reports his symptoms are about the same as yesterday. He reports cough with some associated shortness of breath. He reports mild difficulty breathing at rest. He reports SOB while talking and walking. He denies fever.      Reason for Disposition   MILD difficulty breathing (e.g., minimal/no SOB at rest, SOB with walking, pulse <100)    Additional Information   Negative: SEVERE difficulty breathing (e.g., struggling for each breath, speaks in single words)   Negative: MODERATE difficulty breathing (e.g., speaks in phrases, SOB even at rest, pulse 100-120)   Negative: SEVERE or constant chest pain (Exception: mild central chest pain, present only when coughing)   Negative: Chest pain   Negative: Fever > 103 F (39.4 C)   Negative: [1] Fever > 101 F (38.3 C) AND [2] age > 60   Negative: [1] Fever > 100.0 F (37.8 C) AND [2] bedridden (e.g., nursing home patient, CVA, chronic illness, recovering from surgery)    Protocols used: CORONAVIRUS (COVID-19) DIAGNOSED OR [id number]

## 2020-04-16 ENCOUNTER — NURSE TRIAGE (OUTPATIENT)
Dept: ADMINISTRATIVE | Facility: CLINIC | Age: 30
End: 2020-04-16

## 2020-04-16 NOTE — TELEPHONE ENCOUNTER
"Pt stated he replied "yes" by accident to the Home Symptom Monitoring. He states he has no questions or concerns currently. Informed him to call the [facility]. Pt verbalized understanding.   "

## 2020-10-08 ENCOUNTER — OFFICE VISIT (OUTPATIENT)
Dept: INTERNAL MEDICINE | Facility: CLINIC | Age: 30
End: 2020-10-08

## 2020-10-08 VITALS
DIASTOLIC BLOOD PRESSURE: 76 MMHG | SYSTOLIC BLOOD PRESSURE: 109 MMHG | HEIGHT: 76 IN | BODY MASS INDEX: 25.5 KG/M2 | HEART RATE: 76 BPM | WEIGHT: 209.44 LBS | TEMPERATURE: 98 F

## 2020-10-08 DIAGNOSIS — L02.91 ABSCESS: Primary | ICD-10-CM

## 2020-10-08 DIAGNOSIS — L05.92 PILONIDAL SINUS: ICD-10-CM

## 2020-10-08 DIAGNOSIS — K21.9 GASTRO-ESOPHAGEAL REFLUX DISEASE WITHOUT ESOPHAGITIS: ICD-10-CM

## 2020-10-08 PROCEDURE — 99213 OFFICE O/P EST LOW 20 MIN: CPT | Mod: S$GLB,,, | Performed by: INTERNAL MEDICINE

## 2020-10-08 PROCEDURE — 99213 PR OFFICE/OUTPT VISIT, EST, LEVL III, 20-29 MIN: ICD-10-PCS | Mod: S$GLB,,, | Performed by: INTERNAL MEDICINE

## 2020-10-08 RX ORDER — CLINDAMYCIN HYDROCHLORIDE 300 MG/1
300 CAPSULE ORAL EVERY 6 HOURS
Qty: 30 CAPSULE | Refills: 0 | Status: SHIPPED | OUTPATIENT
Start: 2020-10-08 | End: 2022-04-11

## 2020-10-08 NOTE — PROGRESS NOTES
Chief C/o:    Gastroesophageal Reflux and Cyst (Pt. c/o a cyst in between buttocks x 3 days. he has no pain when sitting, but has pain when he moves.)        Health Care Maintenance    Health Maintenance       Date Due Completion Date    Hepatitis C Screening 1988 ---    Lipid Panel 1988 ---    HIV Screening 07/02/2003 ---    TETANUS VACCINE 10/08/2021 (Originally 7/2/2006) ---    Pneumococcal Vaccine (Medium Risk) (1 of 1 - PPSV23) 10/08/2021 (Originally 7/2/2007) ---                 HISTORY OF PRESENT ILLNESS:    MERRITT Singh is a 30 y.o. male who presents to the clinic today for Gastroesophageal Reflux and Cyst (Pt. c/o a cyst in between buttocks x 3 days. he has no pain when sitting, but has pain when he moves.)  .  Patient is having pains full swelling in the sacral area that he started to have about 3 days ago, it is very painful that patient is unable sit down comfortable.  He denies fever or chills.  Patient also complaining of abdominal pain and abdominal gases that comes and goes for a long period of time, he was once admitted for suspicion of appendicitis however the surgery which was supposed to be done was canceled after patient's symptoms improved.  Patient still thinking that he has something in the abdomen that nobody is able to diagnose or treat.  I reviewed his chart through epic system, it looks he had CT scans of the abdomen about a couple of years ago an ultrasound, with no significant findings.                  ALLERGIES AND MEDICATIONS: updated and reviewed.  Review of patient's allergies indicates:  No Known Allergies  Medication List with Changes/Refills   New Medications    CLINDAMYCIN (CLEOCIN) 300 MG CAPSULE    Take 1 capsule (300 mg total) by mouth every 6 (six) hours.   Current Medications    ACETAMINOPHEN (TYLENOL) 500 MG TABLET    Take 1 tablet (500 mg total) by mouth every 4 (four) hours as needed (Fever).    ALBUTEROL (PROVENTIL/VENTOLIN HFA) 90 MCG/ACTUATION  INHALER    Inhale 1-2 puffs into the lungs every 6 (six) hours as needed for Wheezing or Shortness of Breath. Rescue    BENZONATATE (TESSALON) 100 MG CAPSULE    Take 1 capsule (100 mg total) by mouth 3 (three) times daily as needed for Cough.             CARE TEAM:    Patient Care Team:  Dori López MD as PCP - General (Internal Medicine)         REVIEW OF SYSTEMS:    Review of Systems   Constitutional: Negative for appetite change, chills, diaphoresis, fatigue, fever and unexpected weight change.   HENT: Negative for congestion, drooling, ear discharge, ear pain, facial swelling, hearing loss, nosebleeds, rhinorrhea, sinus pain, sneezing, sore throat, tinnitus, trouble swallowing and voice change.    Eyes: Negative for pain, discharge, redness, itching and visual disturbance.   Respiratory: Negative for cough, choking, chest tightness, shortness of breath, wheezing and stridor.    Cardiovascular: Negative for chest pain, palpitations and leg swelling.   Gastrointestinal: Positive for abdominal distention and abdominal pain (Chronic intermittent abdominal pains on and off with abdominal gases.  No pain today). Negative for blood in stool, constipation, diarrhea, nausea and vomiting.   Endocrine: Negative for cold intolerance, heat intolerance, polydipsia, polyphagia and polyuria.   Genitourinary: Negative for difficulty urinating, dysuria, flank pain, frequency, hematuria and urgency.   Musculoskeletal: Negative for arthralgias, back pain, gait problem, joint swelling, myalgias, neck pain and neck stiffness.   Skin: Negative for color change, pallor, rash and wound.        Swelling and pain in the sacral area of 3 days duration   Allergic/Immunologic: Negative for environmental allergies, food allergies and immunocompromised state.   Neurological: Negative for dizziness, tremors, seizures, syncope, speech difficulty, weakness, light-headedness, numbness and headaches.   Hematological: Negative for  "adenopathy. Does not bruise/bleed easily.   Psychiatric/Behavioral: Negative for agitation, behavioral problems, confusion, decreased concentration, dysphoric mood, hallucinations, sleep disturbance and suicidal ideas. The patient is not nervous/anxious.          PHYSICAL EXAM:    Vitals:    10/08/20 0943   BP: 109/76   Pulse: 76   Temp: 97.7 °F (36.5 °C)     Weight: 95 kg (209 lb 7 oz)   Height: 6' 4" (193 cm)   Body mass index is 25.49 kg/m².  Vitals:    10/08/20 0943   BP: 109/76   Pulse: 76   Temp: 97.7 °F (36.5 °C)   TempSrc: Temporal   Weight: 95 kg (209 lb 7 oz)   Height: 6' 4" (1.93 m)   PainSc:   8   PainLoc: Buttocks          Physical Exam   Constitutional: He is oriented to person, place, and time. He appears well-developed and well-nourished.  Non-toxic appearance. He does not appear ill. No distress.   Patient is alert and awake, cooperative, he looks uncomfortable secondary to pain, he is unable to sit comfortable on a chair or examination table.   HENT:   Head: Normocephalic and atraumatic.   Right Ear: Tympanic membrane, external ear and ear canal normal.   Left Ear: Tympanic membrane, external ear and ear canal normal.   Nose: Nose normal. No rhinorrhea or nasal congestion.   Mouth/Throat: Oropharynx is clear and moist. Mucous membranes are moist. No oropharyngeal exudate or posterior oropharyngeal erythema. Oropharynx is clear.   Eyes: Pupils are equal, round, and reactive to light. Conjunctivae are normal. Right eye exhibits no discharge. Left eye exhibits no discharge. No scleral icterus.   Neck: Normal range of motion. Neck supple. No JVD present. No muscular tenderness present. No neck rigidity. No tracheal deviation present. No thyromegaly present.   Cardiovascular: Normal rate, regular rhythm, normal heart sounds and normal pulses. Exam reveals no gallop and no friction rub.   No murmur heard.  Pulmonary/Chest: Effort normal and breath sounds normal. No stridor. No respiratory distress. He " has no wheezes. He has no rhonchi. He has no rales. He exhibits no tenderness.   Abdominal: Soft. Bowel sounds are normal. He exhibits no distension and no mass. There is no abdominal tenderness. There is no rebound and no guarding. No hernia.   Abdominal examination is benign   Genitourinary:    Genitourinary Comments: No costovertebral angle tenderness.     Musculoskeletal: Normal range of motion.         General: No swelling, tenderness, deformity or signs of injury.      Right lower leg: No edema.      Left lower leg: No edema.   Lymphadenopathy:     He has no cervical adenopathy.   Neurological: He is alert and oriented to person, place, and time. He displays no weakness and normal reflexes. No cranial nerve deficit or sensory deficit. He exhibits normal muscle tone. Coordination normal.   Skin: Skin is warm and dry. Capillary refill takes less than 2 seconds. No bruising, no lesion and no rash noted. He is not diaphoretic. No erythema. No jaundice or pallor.   There is a small sinus with no discharge in the sacrococcygeal area, and there is swelling tenderness or and induration in the right side over the sacrococcygeal area, with no discharge, no fluctuation.   Psychiatric: His behavior is normal. Mood, judgment and thought content normal.          Labs:    Lab Results   Component Value Date    GLU 98 07/21/2018     07/21/2018    K 4.7 07/21/2018     07/21/2018    CO2 27 07/21/2018    BUN 9 07/21/2018    CREATININE 1.0 07/21/2018    CALCIUM 8.0 (L) 07/21/2018    PROT 5.9 (L) 07/21/2018    ALBUMIN 3.4 (L) 07/21/2018    BILITOT 0.8 07/21/2018    ALKPHOS 64 07/21/2018    AST 12 07/21/2018    ALT 11 07/21/2018    ANIONGAP 4 (L) 07/21/2018    ESTGFRAFRICA >60 07/21/2018    EGFRNONAA >60 07/21/2018     Lab Results   Component Value Date    WBC 9.03 07/21/2018    RBC 4.78 07/21/2018    HGB 12.8 (L) 07/21/2018    HCT 38.1 (L) 07/21/2018    MCV 80 (L) 07/21/2018    RDW 14.0 07/21/2018     07/21/2018       No results found for: CHOL, TRIG, HDL, LDLCALC, TOTALCHOLEST  No results found for: TSH  No results found for: HGBA1C, ESTIMATEDAVG       ASSESSMENT & PLAN:    1. Abscess, sacral area  - clindamycin (CLEOCIN) 300 MG capsule; Take 1 capsule (300 mg total) by mouth every 6 (six) hours.  Dispense: 30 capsule; Refill: 0    2. Pilonidal sinus    3. BMI 25.0-25.9,adult    4. Gastro-esophageal reflux disease without esophagitis     Patient is presenting with early abscess in the sacral area with by running the sinus.  The abscess is not ready for incision and drainage it.  Will treated with antibiotics and Sitz baths for now with observation.  Follow-up early next week.  Over the weekend if the patient feels fever chills pain is getting worse or felt bad and anyways he was advised to go to the emergency room for evaluation and possible incision and drainage.  Ultimately he may need surgical intervention.  Patient was sent to [facility] lab for labs as he is fasting and wanted some blood test, basic routine labs were ordered.        No orders of the defined types were placed in this encounter.     Follow up in about 5 days (around 10/13/2020). or sooner as needed.    Patient was counseled and questions and concerns were addressed.    Please note:  Parts of this report were done using a dictation software, voice to text, and sometimes the text contains some uncorrected words or sentences that are missed during revision.

## 2020-10-12 ENCOUNTER — OFFICE VISIT (OUTPATIENT)
Dept: INTERNAL MEDICINE | Facility: CLINIC | Age: 30
End: 2020-10-12

## 2020-10-12 VITALS
SYSTOLIC BLOOD PRESSURE: 123 MMHG | HEIGHT: 76 IN | BODY MASS INDEX: 25.6 KG/M2 | HEART RATE: 91 BPM | WEIGHT: 210.19 LBS | DIASTOLIC BLOOD PRESSURE: 87 MMHG | TEMPERATURE: 98 F

## 2020-10-12 DIAGNOSIS — L02.91 ABSCESS: Primary | ICD-10-CM

## 2020-10-12 DIAGNOSIS — E78.2 MIXED HYPERLIPIDEMIA: ICD-10-CM

## 2020-10-12 PROCEDURE — 99212 OFFICE O/P EST SF 10 MIN: CPT | Mod: S$GLB,,, | Performed by: INTERNAL MEDICINE

## 2020-10-12 PROCEDURE — 99212 PR OFFICE/OUTPT VISIT, EST, LEVL II, 10-19 MIN: ICD-10-PCS | Mod: S$GLB,,, | Performed by: INTERNAL MEDICINE

## 2020-10-12 RX ORDER — IBUPROFEN 600 MG/1
TABLET ORAL
COMMUNITY
Start: 2020-10-10 | End: 2022-04-11

## 2020-10-12 RX ORDER — OXYCODONE AND ACETAMINOPHEN 5; 325 MG/1; MG/1
1 TABLET ORAL
COMMUNITY
Start: 2020-10-10 | End: 2020-10-15

## 2020-10-12 NOTE — PROGRESS NOTES
Chief C/o:    Transitional Care (follow up after ER visit for infected Pilonidal Cyst), Gastroesophageal Reflux, and Nicotine Dependence        Health Care Maintenance    Health Maintenance       Date Due Completion Date    Hepatitis C Screening 1988 ---    Lipid Panel 1988 ---    HIV Screening 07/02/2003 ---    TETANUS VACCINE 10/08/2021 (Originally 7/2/2006) ---    Pneumococcal Vaccine (Medium Risk) (1 of 1 - PPSV23) 10/08/2021 (Originally 7/2/2007) ---                 HISTORY OF PRESENT ILLNESS:    MERRITT Singh is a 30 y.o. male who presents to the clinic today for Transitional Care (follow up after ER visit for infected Pilonidal Cyst), Gastroesophageal Reflux, and Nicotine Dependence  .  Patient went to the emergency room on 10/10/2020 because the sacral abscess, incision and drainage was done and packing.  He is coming today for following regarding the abscess as well as the labs that he did.  Pain is less but still significant and rated about 6 to 7/10, no fever no chills                  ALLERGIES AND MEDICATIONS: updated and reviewed.  Review of patient's allergies indicates:  No Known Allergies  Medication List with Changes/Refills   Current Medications    CLINDAMYCIN (CLEOCIN) 300 MG CAPSULE    Take 1 capsule (300 mg total) by mouth every 6 (six) hours.    IBUPROFEN (ADVIL,MOTRIN) 600 MG TABLET    TAKE 1 TABLET BY MOUTH EVERY 6 HOURS AS NEEDED FOR PAIN FOR UP TO 10 DAYS    OXYCODONE-ACETAMINOPHEN (PERCOCET) 5-325 MG PER TABLET    Take 1 tablet by mouth.             CARE TEAM:    Patient Care Team:  Dori López MD as PCP - General (Internal Medicine)         REVIEW OF SYSTEMS:    Review of Systems   Constitutional: Negative for fever.   Skin:        Sacral abscess, was a size and drain in the emergency room, still having pain 6 to 7/10 at its worse.  It is packed         PHYSICAL EXAM:    Vitals:    10/12/20 1528   BP: 123/87   Pulse: 91   Temp: 98.4 °F (36.9 °C)     Weight:  "95.4 kg (210 lb 3.3 oz)   Height: 6' 4" (193 cm)   Body mass index is 25.59 kg/m².  Vitals:    10/12/20 1528   BP: 123/87   Pulse: 91   Temp: 98.4 °F (36.9 °C)   TempSrc: Temporal   Weight: 95.4 kg (210 lb 3.3 oz)   Height: 6' 4" (1.93 m)   PainSc:   2   PainLoc: Rectum          Physical Exam   Constitutional:   Looks in discomfort secondary to pain, unable to sit on the chair straight, rather on the side of his body.   Skin:   Sacral of his side is incised or, small incision with back in.  Positive redness tenderness and swelling around it, the incision is located on the left side of the sacrum          Labs:    Patient labs were done at [facility], scanned to the chart  CBC was unremarkable, CMP was unremarkable, lipids were abnormal with HDL low at 33, total cholesterol 220 elevated, with triglyceride 214 elevated,  elevated.    ASSESSMENT & PLAN:    1. Mixed hyperlipidemia    2. Abscess, sacral area     Regarding mixed hyperlipidemia diet and exercise were advised, will recheck in about 6 months.  Regarding his abscess the pack was removed and reinserted with simple dressing, he will need daily dressing change, and continue his antibiotics.        No orders of the defined types were placed in this encounter.     No follow-ups on file. or sooner as needed.    Patient was counseled and questions and concerns were addressed.    Please note:  Parts of this report were done using a dictation software, voice to text, and sometimes the text contains some uncorrected words or sentences that are missed during revision.  "

## 2020-12-24 ENCOUNTER — NURSE TRIAGE (OUTPATIENT)
Dept: ADMINISTRATIVE | Facility: CLINIC | Age: 30
End: 2020-12-24

## 2020-12-25 NOTE — TELEPHONE ENCOUNTER
Patient wanted information about a test result.  Redirect to discuss with his PCP.    Reason for Disposition   [1] Caller requesting NON-URGENT health information AND [2] PCP's office is the best resource    Additional Information   Negative: [1] Caller is not with the adult (patient) AND [2] reporting urgent symptoms   Negative: Lab result questions   Negative: Medication questions   Negative: Caller can't be reached by phone   Negative: Caller has already spoken to PCP or another triager   Negative: RN needs further essential information from caller in order to complete triage   Negative: Requesting regular office appointment    Protocols used: INFORMATION ONLY CALL - NO TRIAGE-A-

## 2021-03-31 DIAGNOSIS — Z11.59 NEED FOR HEPATITIS C SCREENING TEST: ICD-10-CM

## 2021-04-14 NOTE — PLAN OF CARE
07/21/18 1257   Final Note   Assessment Type Final Discharge Note   Discharge Disposition Home   What phone number can be called within the next 1-3 days to see how you are doing after discharge? (see chart)   Hospital Follow Up  Appt(s) scheduled? No  (information to make appt.  weekend)   Discharge plans and expectations educations in teach back method with documentation complete? Yes   Right Care Referral Info   Post Acute Recommendation No Care      not applicable (Male)

## 2022-01-08 ENCOUNTER — LAB VISIT (OUTPATIENT)
Dept: PRIMARY CARE CLINIC | Facility: OTHER | Age: 32
End: 2022-01-08
Attending: INTERNAL MEDICINE

## 2022-01-08 DIAGNOSIS — R05.9 COUGH: ICD-10-CM

## 2022-01-08 DIAGNOSIS — Z20.822 ENCOUNTER FOR LABORATORY TESTING FOR COVID-19 VIRUS: ICD-10-CM

## 2022-01-08 PROCEDURE — U0003 INFECTIOUS AGENT DETECTION BY NUCLEIC ACID (DNA OR RNA); SEVERE ACUTE RESPIRATORY SYNDROME CORONAVIRUS 2 (SARS-COV-2) (CORONAVIRUS DISEASE [COVID-19]), AMPLIFIED PROBE TECHNIQUE, MAKING USE OF HIGH THROUGHPUT TECHNOLOGIES AS DESCRIBED BY CMS-2020-01-R: HCPCS | Performed by: INTERNAL MEDICINE

## 2022-01-11 LAB
SARS-COV-2 RNA RESP QL NAA+PROBE: DETECTED
SARS-COV-2- CYCLE NUMBER: 25

## 2022-04-11 ENCOUNTER — OFFICE VISIT (OUTPATIENT)
Dept: INTERNAL MEDICINE | Facility: CLINIC | Age: 32
End: 2022-04-11

## 2022-04-11 VITALS
HEART RATE: 79 BPM | HEIGHT: 76 IN | DIASTOLIC BLOOD PRESSURE: 70 MMHG | WEIGHT: 206.56 LBS | SYSTOLIC BLOOD PRESSURE: 112 MMHG | BODY MASS INDEX: 25.15 KG/M2 | TEMPERATURE: 97 F

## 2022-04-11 DIAGNOSIS — E78.2 MIXED HYPERLIPIDEMIA: ICD-10-CM

## 2022-04-11 DIAGNOSIS — Z72.0 TOBACCO ABUSE: ICD-10-CM

## 2022-04-11 DIAGNOSIS — K21.9 GASTRO-ESOPHAGEAL REFLUX DISEASE WITHOUT ESOPHAGITIS: Primary | ICD-10-CM

## 2022-04-11 DIAGNOSIS — Z71.6 TOBACCO ABUSE COUNSELING: ICD-10-CM

## 2022-04-11 DIAGNOSIS — K59.09 CHRONIC CONSTIPATION: ICD-10-CM

## 2022-04-11 PROCEDURE — 99213 OFFICE O/P EST LOW 20 MIN: CPT | Mod: S$GLB,,, | Performed by: INTERNAL MEDICINE

## 2022-04-11 PROCEDURE — 99213 PR OFFICE/OUTPT VISIT, EST, LEVL III, 20-29 MIN: ICD-10-PCS | Mod: S$GLB,,, | Performed by: INTERNAL MEDICINE

## 2022-04-11 RX ORDER — HYDROGEN PEROXIDE 3 %
20 SOLUTION, NON-ORAL MISCELLANEOUS
Qty: 30 CAPSULE | Refills: 11
Start: 2022-04-11 | End: 2023-04-11

## 2022-04-11 RX ORDER — LINACLOTIDE 290 UG/1
290 CAPSULE, GELATIN COATED ORAL DAILY
COMMUNITY
Start: 2022-02-03

## 2022-04-11 NOTE — PROGRESS NOTES
Chief C/o:    Lump (Pt. c/o a lump in mid chest x 1 1/2 yrs. Pt. Has no pain but c/o that it has grown x 1 week.), Hyperlipidemia, Gastroesophageal Reflux, and Nicotine Dependence        Health Care Maintenance    Health Maintenance       Date Due Completion Date    Hepatitis C Screening Never done ---    Lipid Panel Never done ---    HIV Screening Never done ---    TETANUS VACCINE 04/11/2023 (Originally 7/2/2006) ---    COVID-19 Vaccine (3 - Booster for Pfizer series) 04/11/2023 (Originally 9/22/2021) 4/22/2021    Pneumococcal Vaccines (Age 0-64) (1 - PCV) 04/11/2023 (Originally 7/2/1994) ---                 HISTORY OF PRESENT ILLNESS:    MERRITT Singh is a 31 y.o. male who presents to the clinic today for Lump (Pt. c/o a lump in mid chest x 1 1/2 yrs. Pt. Has no pain but c/o that it has grown x 1 week.), Hyperlipidemia, Gastroesophageal Reflux, and Nicotine Dependence  .  Patient is worried about a lump that he felt in the epigastric area, that he was feeling for the past 1 year and half, with no pain and no other symptoms.  Patient is having no chest pain, no shortness of breath, no fever no chills.  Patient is having no side effects related to current medications.                ALLERGIES AND MEDICATIONS: updated and reviewed.  Review of patient's allergies indicates:  No Known Allergies  Medication List with Changes/Refills   New Medications    ESOMEPRAZOLE (NEXIUM) 20 MG CAPSULE    Take 1 capsule (20 mg total) by mouth before breakfast.   Current Medications    LINZESS 290 MCG CAP CAPSULE    Take 290 mcg by mouth once daily.   Discontinued Medications    CLINDAMYCIN (CLEOCIN) 300 MG CAPSULE    Take 1 capsule (300 mg total) by mouth every 6 (six) hours.    IBUPROFEN (ADVIL,MOTRIN) 600 MG TABLET    TAKE 1 TABLET BY MOUTH EVERY 6 HOURS AS NEEDED FOR PAIN FOR UP TO 10 DAYS       Problem List:  Patient Active Problem List   Diagnosis    BMI 25.0-25.9,adult    Tobacco abuse counseling    Tobacco abuse     Gastro-esophageal reflux disease without esophagitis    Abscess, sacral area    Pilonidal sinus    Mixed hyperlipidemia    Chronic constipation         CARE TEAM:    Patient Care Team:  Dori López MD as PCP - General (Internal Medicine)         REVIEW OF SYSTEMS:    Review of Systems   Constitutional: Negative for appetite change, chills, diaphoresis, fatigue, fever and unexpected weight change.   HENT: Negative for congestion, drooling, ear discharge, ear pain, facial swelling, hearing loss, nosebleeds, rhinorrhea, sinus pain, sneezing, sore throat, tinnitus, trouble swallowing and voice change.    Eyes: Negative for pain, discharge, redness, itching and visual disturbance.   Respiratory: Negative for cough, choking, chest tightness, shortness of breath, wheezing and stridor.    Cardiovascular: Negative for chest pain, palpitations and leg swelling.   Gastrointestinal: Negative for abdominal distention, abdominal pain, blood in stool, constipation, diarrhea, nausea and vomiting.        Note that the patient point of concern is his is xiphisternum, with no significant abnormalities   Endocrine: Negative for cold intolerance, heat intolerance, polydipsia, polyphagia and polyuria.   Genitourinary: Negative for difficulty urinating, dysuria, flank pain, frequency, hematuria and urgency.   Musculoskeletal: Negative for arthralgias, back pain, gait problem, joint swelling, myalgias, neck pain and neck stiffness.   Skin: Negative for color change, pallor, rash and wound.   Allergic/Immunologic: Negative for environmental allergies, food allergies and immunocompromised state.   Neurological: Negative for dizziness, tremors, seizures, syncope, speech difficulty, weakness, light-headedness, numbness and headaches.   Hematological: Negative for adenopathy. Does not bruise/bleed easily.   Psychiatric/Behavioral: Negative for agitation, behavioral problems, confusion, decreased concentration, dysphoric mood,  "hallucinations, sleep disturbance and suicidal ideas. The patient is not nervous/anxious.          PHYSICAL EXAM:    Vitals:    04/11/22 1345   BP: 112/70   Pulse: 79   Temp: 97.4 °F (36.3 °C)     Weight: 93.7 kg (206 lb 9.1 oz)   Height: 6' 4" (193 cm)   Body mass index is 25.14 kg/m².  Vitals:    04/11/22 1345   BP: 112/70   Pulse: 79   Temp: 97.4 °F (36.3 °C)   TempSrc: Temporal   Weight: 93.7 kg (206 lb 9.1 oz)   Height: 6' 4" (1.93 m)   PainSc: 0-No pain          Physical Exam  Vitals and nursing note reviewed.   Constitutional:       General: He is not in acute distress.     Appearance: He is not ill-appearing, toxic-appearing or diaphoretic.      Comments: Patient is alert, awake and oriented X 3.  Patient is comfortable, cooperative and in no apparent distress.  Mildly overweight   HENT:      Head: Normocephalic and atraumatic.      Right Ear: Tympanic membrane, ear canal and external ear normal. There is no impacted cerumen.      Left Ear: Tympanic membrane, ear canal and external ear normal. There is no impacted cerumen.      Nose: Nose normal. No congestion or rhinorrhea.      Mouth/Throat:      Mouth: Mucous membranes are moist.      Pharynx: Oropharynx is clear. No oropharyngeal exudate or posterior oropharyngeal erythema.   Eyes:      General: No scleral icterus.        Right eye: No discharge.         Left eye: No discharge.      Extraocular Movements: Extraocular movements intact.      Conjunctiva/sclera: Conjunctivae normal.      Pupils: Pupils are equal, round, and reactive to light.   Cardiovascular:      Rate and Rhythm: Normal rate and regular rhythm.      Pulses: Normal pulses.      Heart sounds: Normal heart sounds. No murmur heard.    No friction rub. No gallop.   Pulmonary:      Effort: Pulmonary effort is normal. No respiratory distress.      Breath sounds: Normal breath sounds. No stridor. No wheezing, rhonchi or rales.      Comments: Normal exam  Chest:      Chest wall: No tenderness. "   Abdominal:      General: Bowel sounds are normal. There is no distension.      Palpations: Abdomen is soft. There is no mass.      Tenderness: There is no abdominal tenderness. There is no guarding or rebound.      Hernia: No hernia is present.      Comments: Normal exam   Musculoskeletal:         General: No swelling, tenderness, deformity or signs of injury. Normal range of motion.      Cervical back: Normal range of motion and neck supple. No rigidity.      Right lower leg: No edema.      Left lower leg: No edema.   Lymphadenopathy:      Cervical: No cervical adenopathy.   Skin:     General: Skin is warm and dry.      Capillary Refill: Capillary refill takes less than 2 seconds.      Coloration: Skin is not jaundiced or pale.      Findings: No bruising, erythema, lesion or rash.   Neurological:      General: No focal deficit present.      Mental Status: He is alert and oriented to person, place, and time.      Cranial Nerves: No cranial nerve deficit.      Sensory: No sensory deficit.      Motor: No weakness.      Coordination: Coordination normal.      Deep Tendon Reflexes: Reflexes normal.   Psychiatric:         Mood and Affect: Mood normal.         Behavior: Behavior normal.         Thought Content: Thought content normal.         Judgment: Judgment normal.            Labs:    Lab Results   Component Value Date    GLU 98 07/21/2018     07/21/2018    K 4.7 07/21/2018     07/21/2018    CO2 27 07/21/2018    BUN 9 07/21/2018    CREATININE 1.0 07/21/2018    CALCIUM 8.0 (L) 07/21/2018    PROT 5.9 (L) 07/21/2018    ALBUMIN 3.4 (L) 07/21/2018    BILITOT 0.8 07/21/2018    ALKPHOS 64 07/21/2018    AST 12 07/21/2018    ALT 11 07/21/2018    ANIONGAP 4 (L) 07/21/2018    ESTGFRAFRICA >60 07/21/2018    EGFRNONAA >60 07/21/2018     Lab Results   Component Value Date    WBC 9.03 07/21/2018    RBC 4.78 07/21/2018    HGB 12.8 (L) 07/21/2018    HCT 38.1 (L) 07/21/2018    MCV 80 (L) 07/21/2018    RDW 14.0 07/21/2018      07/21/2018      No results found for: CHOL, TRIG, HDL, LDLCALC, TOTALCHOLEST  No results found for: TSH  No results found for: HGBA1C, ESTIMATEDAVG   No components found for: MICROALBUMIN/CREATININE    ASSESSMENT & PLAN:    1. Gastro-esophageal reflux disease without esophagitis    2. Chronic constipation    3. Mixed hyperlipidemia    4. BMI 25.0-25.9,adult    5. Tobacco abuse    6. Tobacco abuse counseling       Patient is or part of interest and reason for this visit was his xiphisternum, which was normal on examination.  Labs were ordered CBC, VMP, Lipids, and TSH., follow up after tests are done.    No orders of the defined types were placed in this encounter.     No follow-ups on file. or sooner as needed.    Patient was counseled and questions and concerns were addressed.    Please note:  Parts of this report were done using a dictation software, voice to text, and sometimes the text contains some uncorrected words or sentences that are missed during revision.

## 2023-10-03 ENCOUNTER — HOSPITAL ENCOUNTER (EMERGENCY)
Facility: HOSPITAL | Age: 33
Discharge: HOME | End: 2023-10-03
Attending: STUDENT IN AN ORGANIZED HEALTH CARE EDUCATION/TRAINING PROGRAM

## 2023-10-03 VITALS
HEART RATE: 78 BPM | DIASTOLIC BLOOD PRESSURE: 82 MMHG | WEIGHT: 200 LBS | BODY MASS INDEX: 24.36 KG/M2 | RESPIRATION RATE: 16 BRPM | TEMPERATURE: 98 F | HEIGHT: 76 IN | OXYGEN SATURATION: 98 % | SYSTOLIC BLOOD PRESSURE: 117 MMHG

## 2023-10-03 DIAGNOSIS — K59.00 CONSTIPATION, UNSPECIFIED CONSTIPATION TYPE: ICD-10-CM

## 2023-10-03 DIAGNOSIS — F41.9 ANXIETY: ICD-10-CM

## 2023-10-03 DIAGNOSIS — K21.9 GASTROESOPHAGEAL REFLUX DISEASE, UNSPECIFIED WHETHER ESOPHAGITIS PRESENT: Primary | ICD-10-CM

## 2023-10-03 DIAGNOSIS — R07.9 CHEST PAIN: ICD-10-CM

## 2023-10-03 LAB
ALBUMIN SERPL BCP-MCNC: 4.3 G/DL (ref 3.5–5.2)
ALP SERPL-CCNC: 82 U/L (ref 55–135)
ALT SERPL W/O P-5'-P-CCNC: 17 U/L (ref 10–44)
ANION GAP SERPL CALC-SCNC: 10 MMOL/L (ref 8–16)
AST SERPL-CCNC: 18 U/L (ref 10–40)
BASOPHILS # BLD AUTO: 0.06 K/UL (ref 0–0.2)
BASOPHILS NFR BLD: 0.5 % (ref 0–1.9)
BILIRUB SERPL-MCNC: 0.5 MG/DL (ref 0.1–1)
BUN SERPL-MCNC: 16 MG/DL (ref 6–20)
CALCIUM SERPL-MCNC: 9.1 MG/DL (ref 8.7–10.5)
CHLORIDE SERPL-SCNC: 104 MMOL/L (ref 95–110)
CO2 SERPL-SCNC: 24 MMOL/L (ref 23–29)
CREAT SERPL-MCNC: 1.1 MG/DL (ref 0.5–1.4)
DIFFERENTIAL METHOD: ABNORMAL
EOSINOPHIL # BLD AUTO: 0.2 K/UL (ref 0–0.5)
EOSINOPHIL NFR BLD: 1.8 % (ref 0–8)
ERYTHROCYTE [DISTWIDTH] IN BLOOD BY AUTOMATED COUNT: 13.4 % (ref 11.5–14.5)
EST. GFR  (NO RACE VARIABLE): >60 ML/MIN/1.73 M^2
GLUCOSE SERPL-MCNC: 88 MG/DL (ref 70–110)
HCT VFR BLD AUTO: 45.3 % (ref 40–54)
HGB BLD-MCNC: 15.1 G/DL (ref 14–18)
IMM GRANULOCYTES # BLD AUTO: 0.1 K/UL (ref 0–0.04)
IMM GRANULOCYTES NFR BLD AUTO: 0.8 % (ref 0–0.5)
LIPASE SERPL-CCNC: 13 U/L (ref 4–60)
LYMPHOCYTES # BLD AUTO: 3.2 K/UL (ref 1–4.8)
LYMPHOCYTES NFR BLD: 25.1 % (ref 18–48)
MCH RBC QN AUTO: 26.5 PG (ref 27–31)
MCHC RBC AUTO-ENTMCNC: 33.3 G/DL (ref 32–36)
MCV RBC AUTO: 80 FL (ref 82–98)
MONOCYTES # BLD AUTO: 1.3 K/UL (ref 0.3–1)
MONOCYTES NFR BLD: 9.9 % (ref 4–15)
NEUTROPHILS # BLD AUTO: 7.9 K/UL (ref 1.8–7.7)
NEUTROPHILS NFR BLD: 61.9 % (ref 38–73)
NRBC BLD-RTO: 0 /100 WBC
PLATELET # BLD AUTO: 194 K/UL (ref 150–450)
PMV BLD AUTO: 11.1 FL (ref 9.2–12.9)
POTASSIUM SERPL-SCNC: 3.8 MMOL/L (ref 3.5–5.1)
PROT SERPL-MCNC: 7.6 G/DL (ref 6–8.4)
RBC # BLD AUTO: 5.69 M/UL (ref 4.6–6.2)
SODIUM SERPL-SCNC: 138 MMOL/L (ref 136–145)
TROPONIN I SERPL DL<=0.01 NG/ML-MCNC: <0.006 NG/ML (ref 0–0.03)
WBC # BLD AUTO: 12.79 K/UL (ref 3.9–12.7)

## 2023-10-03 PROCEDURE — 84484 ASSAY OF TROPONIN QUANT: CPT | Performed by: PHYSICIAN ASSISTANT

## 2023-10-03 PROCEDURE — 99285 EMERGENCY DEPT VISIT HI MDM: CPT | Mod: 25

## 2023-10-03 PROCEDURE — 93010 EKG 12-LEAD: ICD-10-PCS | Mod: ,,, | Performed by: INTERNAL MEDICINE

## 2023-10-03 PROCEDURE — 83690 ASSAY OF LIPASE: CPT | Performed by: PHYSICIAN ASSISTANT

## 2023-10-03 PROCEDURE — 85025 COMPLETE CBC W/AUTO DIFF WBC: CPT | Performed by: EMERGENCY MEDICINE

## 2023-10-03 PROCEDURE — 84443 ASSAY THYROID STIM HORMONE: CPT | Performed by: EMERGENCY MEDICINE

## 2023-10-03 PROCEDURE — 93010 ELECTROCARDIOGRAM REPORT: CPT | Mod: ,,, | Performed by: INTERNAL MEDICINE

## 2023-10-03 PROCEDURE — 80053 COMPREHEN METABOLIC PANEL: CPT | Performed by: EMERGENCY MEDICINE

## 2023-10-03 PROCEDURE — 93005 ELECTROCARDIOGRAM TRACING: CPT

## 2023-10-03 PROCEDURE — 25000003 PHARM REV CODE 250: Performed by: PHYSICIAN ASSISTANT

## 2023-10-03 RX ORDER — MAG HYDROX/ALUMINUM HYD/SIMETH 200-200-20
30 SUSPENSION, ORAL (FINAL DOSE FORM) ORAL
Status: COMPLETED | OUTPATIENT
Start: 2023-10-03 | End: 2023-10-03

## 2023-10-03 RX ORDER — POLYETHYLENE GLYCOL 3350 17 G/17G
17 POWDER, FOR SOLUTION ORAL DAILY
Qty: 510 G | Refills: 0 | Status: SHIPPED | OUTPATIENT
Start: 2023-10-03

## 2023-10-03 RX ORDER — ALUMINUM HYDROXIDE, MAGNESIUM HYDROXIDE, AND SIMETHICONE 2400; 240; 2400 MG/30ML; MG/30ML; MG/30ML
15 SUSPENSION ORAL EVERY 6 HOURS PRN
Qty: 335 ML | Refills: 0 | OUTPATIENT
Start: 2023-10-03 | End: 2023-12-31

## 2023-10-03 RX ORDER — ASPIRIN 325 MG
325 TABLET, DELAYED RELEASE (ENTERIC COATED) ORAL
Status: COMPLETED | OUTPATIENT
Start: 2023-10-03 | End: 2023-10-03

## 2023-10-03 RX ADMIN — ALUMINUM HYDROXIDE, MAGNESIUM HYDROXIDE, AND DIMETHICONE 30 ML: 200; 20; 200 SUSPENSION ORAL at 10:10

## 2023-10-03 RX ADMIN — ASPIRIN 325 MG: 325 TABLET, COATED ORAL at 10:10

## 2023-10-04 LAB — TSH SERPL DL<=0.005 MIU/L-ACNC: 1.08 UIU/ML (ref 0.4–4)

## 2023-10-04 NOTE — ED TRIAGE NOTES
Pt arrived to ED with a c/o of chest pain since today evening. Pt describes pain as intermittent, tightness and non radiating in nature associated to SOB. Denies cough, dizziness, sweating, numbness or tingling. Pt is active, alert and oriented.

## 2023-10-04 NOTE — DISCHARGE INSTRUCTIONS
Follow-up with your primary care doctor about constipation related to anxiety.  Ask whether and SSRI such as Lexapro maybe for your symptoms.  Take Maalox 15 ml when you have acid reflux.  Return if you have worsening chest pain, shortness breath, lightheadedness or dizziness.    Thank you for coming to our Emergency Department today. It is important to remember that some problems or medical conditions are difficult to diagnose and may not be found during your Emergency Department visit.     Be sure to follow up with your primary care doctor and review all labs/imaging/tests that were performed during your ER visit with them. Some labs/tests may be outside of the normal range and require non-emergent follow-up and further investigation to help diagnose/exclude/prevent complications or other potentially serious medical conditions that were not addressed during your ER visit.    If you do not have a primary care doctor, you may contact the one listed on your discharge paperwork or you may also call the [facility] Appointment Desk at [phone] to schedule an appointment and establish care with one. Another resources for finding primary care physicians: [email or web address] It is important to your health that you have a primary care doctor.    Please take all medications as directed. All medications may potentially have side-effects and it is impossible to predict which medications may give you side-effects or what side-effects (if any) they will give you. If you feel that you are having a negative effect or side-effect of any medication you should immediately stop taking them and seek medical attention. If you feel that you are having a life-threatening reaction call 911.    Return to the ER with any questions/concerns, new/concerning symptoms, worsening or failure to improve.     Do not drive, swim, climb to height, take a bath, operate heavy machinery, drink alcohol or take potentially sedating medications,  sign any legal documents or make any important decisions for 24 hours if you have received any pain medications, sedatives or mood altering drugs during your ER visit or within 24 hours of taking them if they have been prescribed to you.     You can find additional resources for Dentists, hearing aids, durable medical equipment, low cost pharmacies and other resources at [email or web address]

## 2023-10-04 NOTE — ED PROVIDER NOTES
"Encounter Date: 10/3/2023       History     Chief Complaint   Patient presents with    Chest Pain    Shortness of Breath     Pt presents to ED c/o anterior chest pain, non radiating, intermittent and described as tightness onset x 1hr pta.  Pt also c/o sob and abd pain.  "I feel like I have a lot of gas and it's moving up."  Denies ha, dizziness, n/v, weakness or any other symptoms.  Denies taking medication for symptoms.  Pain 7/10.       33-year-old male with history of GERD presents for midepigastric abdominal pain and anterior substernal chest tightness, nonradiating, feeling like a chest pulling, starting about an hour prior to arrival.  He denies cough, shortness of breath, fever or chills.  States he feels a lot of abdominal gas moving.  He has not taken anything for his symptoms.  He has a history of constipation associated with anxiety.  He does not have other medical problems.      Review of patient's allergies indicates:  No Known Allergies  Past Medical History:   Diagnosis Date    Gastro-esophageal reflux disease without esophagitis     Mixed hyperlipidemia 10/12/2020    Tobacco abuse      History reviewed. No pertinent surgical history.  Family History   Problem Relation Age of Onset    No Known Problems Mother     No Known Problems Father     Hyperlipidemia Sister     No Known Problems Brother     No Known Problems Daughter     No Known Problems Son     No Known Problems Sister      Social History     Tobacco Use    Smoking status: Every Day     Current packs/day: 1.00     Average packs/day: 1 pack/day for 21.0 years (21.0 ttl pk-yrs)     Types: Cigarettes     Start date: 10/8/2002    Smokeless tobacco: Never   Substance Use Topics    Alcohol use: Yes    Drug use: No     Review of Systems   Constitutional:  Negative for activity change and appetite change.   HENT:  Negative for congestion and drooling.    Eyes:  Negative for discharge and itching.   Respiratory:  Positive for chest tightness. Negative " for cough.    Cardiovascular:  Negative for chest pain and leg swelling.   Gastrointestinal:  Positive for abdominal pain. Negative for abdominal distention.   Genitourinary:  Negative for difficulty urinating and dysuria.   Musculoskeletal:  Negative for arthralgias.   Skin:  Negative for color change and pallor.   Neurological:  Negative for dizziness and facial asymmetry.   Psychiatric/Behavioral:  Negative for agitation and behavioral problems.        Physical Exam     Initial Vitals [10/03/23 2124]   BP Pulse Resp Temp SpO2   (!) 135/93 104 18 98.1 °F (36.7 °C) 100 %      MAP       --         Physical Exam    Nursing note and vitals reviewed.  Constitutional: He appears well-developed and well-nourished.   HENT:   Head: Normocephalic and atraumatic.   Mouth/Throat: Oropharynx is clear and moist.   Eyes: Conjunctivae and EOM are normal. Pupils are equal, round, and reactive to light.   Neck: No thyromegaly present.   Normal range of motion.  Cardiovascular:  Normal rate, regular rhythm and intact distal pulses.           Pulmonary/Chest: Breath sounds normal. No respiratory distress. He has no wheezes.   Abdominal: Abdomen is soft. Bowel sounds are normal. He exhibits no distension. There is no abdominal tenderness.   Musculoskeletal:         General: No tenderness or edema. Normal range of motion.      Cervical back: Normal range of motion.     Neurological: He is alert and oriented to person, place, and time. He has normal strength. No cranial nerve deficit.   Skin: Skin is warm and dry. No rash noted.   Psychiatric: He has a normal mood and affect. His behavior is normal. Thought content normal.         ED Course   Procedures  Labs Reviewed   CBC W/ AUTO DIFFERENTIAL - Abnormal; Notable for the following components:       Result Value    WBC 12.79 (*)     MCV 80 (*)     MCH 26.5 (*)     Immature Granulocytes 0.8 (*)     Gran # (ANC) 7.9 (*)     Immature Grans (Abs) 0.10 (*)     Mono # 1.3 (*)     All other  components within normal limits   COMPREHENSIVE METABOLIC PANEL   LIPASE   TROPONIN I   TSH   TSH     EKG Readings: (Independently Interpreted)   Initial Reading: No STEMI.   EKG shows sinus rhythm, tachycardia, no acute ST elevations or depressions, normal NH, QRS, QT intervals, interpreted by me.       Imaging Results              X-Ray Chest 1 View (Final result)  Result time 10/03/23 22:03:47   Procedure changed from X-Ray Chest AP Portable     Final result by Rolando Tarango MD (10/03/23 22:03:47)                   Impression:      No acute cardiopulmonary process identified.      Electronically signed by: Rolando Tarango MD  Date:    10/03/2023  Time:    22:03               Narrative:    EXAMINATION:  XR CHEST 1 VIEW    CLINICAL HISTORY:  pain; Chest pain, unspecified    TECHNIQUE:  Single frontal view of the chest was performed.    COMPARISON:  April 2020.    FINDINGS:  Cardiac silhouette is normal in size.  Lungs are symmetrically expanded.  No evidence of focal consolidative process, pneumothorax, or significant pleural effusion.  No acute osseous abnormality identified.                                       Medications   aluminum-magnesium hydroxide-simethicone 200-200-20 mg/5 mL suspension 30 mL (30 mLs Oral Given 10/3/23 2217)   aspirin EC tablet 325 mg (325 mg Oral Given 10/3/23 2217)     Medical Decision Making  Amount and/or Complexity of Data Reviewed  Labs: ordered.    Risk  OTC drugs.               ED Course as of 10/04/23 0417   Wed Oct 04, 2023   0413 TSH [BS]   0413 Comprehensive metabolic panel [BS]   0413 Lipase [BS]   0413 Troponin I [BS]   0413 CBC auto differential(!) [BS]   0413 I reviewed the CXR independently of the radiologist.     Chest x-ray was negative for acute cardiopulmonary abnormalities, infiltrates or bony abnormalities.     [BS]      ED Course User Index  [BS] Arias Fall MD                        33-year-old male presents for evaluation of epigastric abdominal  pain, substernal chest tightness, starting about an hour prior to arrival, improved with Maalox here.  Workup notable for EKG without acute ischemic changes, troponin within normal limits, less likely ACS given patient has no risk factors aside from smoking, no family history, his age is 33.  Chest x-ray without acute cardiopulmonary abnormality.  CBC with mild leukocytosis with left shift of unclear significance.  TSH within limits, doubt thyroid disorder.  CMP without evidence of electrolyte abnormality.  On reexamination, abdomen is soft and nontender without rebound or guarding, doubt acute intra-abdominal infection.  Patient is feeling much better after Maalox and would like to go home.  On further conversation, patient reports episodes of abdominal pain and constipation when he feels anxious and stress like he did today.  Advised him to follow up with primary care physician about these issues and potential for SSRI for chronic abdominal pain.  Patient also requested prescription for Maalox to supplement the Protonix he takes.  Patient is stable for discharge with outpatient follow-up.    Clinical Impression:   Final diagnoses:  [R07.9] Chest pain  [K21.9] Gastroesophageal reflux disease, unspecified whether esophagitis present (Primary)  [K59.00] Constipation, unspecified constipation type  [F41.9] Anxiety        ED Disposition Condition    Discharge Stable          ED Prescriptions       Medication Sig Dispense Start Date End Date Auth. Provider    aluminum & magnesium hydroxide-simethicone (MAALOX MAXIMUM STRENGTH) 400-400-40 mg/5 mL suspension Take 15 mLs by mouth every 6 (six) hours as needed for Indigestion. 335 mL 10/3/2023 10/2/2024 Arias Fall MD    polyethylene glycol (GLYCOLAX) 17 gram/dose powder Take 17 g by mouth once daily. 510 g 10/3/2023 -- Arias Fall MD          Follow-up Information       Follow up With Specialties Details Why Contact Dori Bañuelos MD  Internal Medicine Call in 1 day To set up a follow-up appointment, To recheck today's symptoms [address] [facility] [address]  [phone]               Arias Fall MD  10/04/23 0417

## 2023-12-31 ENCOUNTER — HOSPITAL ENCOUNTER (EMERGENCY)
Facility: HOSPITAL | Age: 33
Discharge: HOME | End: 2023-12-31
Attending: EMERGENCY MEDICINE

## 2023-12-31 VITALS
WEIGHT: 205 LBS | TEMPERATURE: 98 F | SYSTOLIC BLOOD PRESSURE: 125 MMHG | BODY MASS INDEX: 24.21 KG/M2 | OXYGEN SATURATION: 99 % | HEIGHT: 77 IN | DIASTOLIC BLOOD PRESSURE: 79 MMHG | HEART RATE: 84 BPM | RESPIRATION RATE: 16 BRPM

## 2023-12-31 DIAGNOSIS — J45.909 ASTHMA, UNSPECIFIED ASTHMA SEVERITY, UNSPECIFIED WHETHER COMPLICATED, UNSPECIFIED WHETHER PERSISTENT: Primary | ICD-10-CM

## 2023-12-31 DIAGNOSIS — R06.02 SOB (SHORTNESS OF BREATH): ICD-10-CM

## 2023-12-31 LAB
CTP QC/QA: YES
INFLUENZA A ANTIGEN, POC: NEGATIVE
INFLUENZA B ANTIGEN, POC: NEGATIVE
SARS-COV-2 RDRP RESP QL NAA+PROBE: NEGATIVE

## 2023-12-31 PROCEDURE — 99283 EMERGENCY DEPT VISIT LOW MDM: CPT | Mod: ER

## 2023-12-31 PROCEDURE — 87635 SARS-COV-2 COVID-19 AMP PRB: CPT | Mod: ER | Performed by: EMERGENCY MEDICINE

## 2023-12-31 PROCEDURE — 25000003 PHARM REV CODE 250: Mod: ER

## 2023-12-31 PROCEDURE — 87804 INFLUENZA ASSAY W/OPTIC: CPT | Mod: 59,ER

## 2023-12-31 PROCEDURE — 93005 ELECTROCARDIOGRAM TRACING: CPT | Mod: ER

## 2023-12-31 PROCEDURE — 25000242 PHARM REV CODE 250 ALT 637 W/ HCPCS: Mod: ER

## 2023-12-31 PROCEDURE — 94640 AIRWAY INHALATION TREATMENT: CPT | Mod: ER

## 2023-12-31 PROCEDURE — 93010 ELECTROCARDIOGRAM REPORT: CPT | Mod: ,,, | Performed by: INTERNAL MEDICINE

## 2023-12-31 RX ORDER — MAG HYDROX/ALUMINUM HYD/SIMETH 200-200-20
30 SUSPENSION, ORAL (FINAL DOSE FORM) ORAL EVERY 6 HOURS PRN
Qty: 769 ML | Refills: 0 | Status: SHIPPED | OUTPATIENT
Start: 2023-12-31 | End: 2024-12-30

## 2023-12-31 RX ORDER — ALBUTEROL SULFATE 0.83 MG/ML
2.5 SOLUTION RESPIRATORY (INHALATION)
Status: COMPLETED | OUTPATIENT
Start: 2023-12-31 | End: 2023-12-31

## 2023-12-31 RX ORDER — MAG HYDROX/ALUMINUM HYD/SIMETH 200-200-20
30 SUSPENSION, ORAL (FINAL DOSE FORM) ORAL
Status: COMPLETED | OUTPATIENT
Start: 2023-12-31 | End: 2023-12-31

## 2023-12-31 RX ORDER — ALBUTEROL SULFATE 90 UG/1
1-2 AEROSOL, METERED RESPIRATORY (INHALATION) EVERY 6 HOURS PRN
Qty: 18 G | Refills: 0 | Status: SHIPPED | OUTPATIENT
Start: 2023-12-31 | End: 2024-12-30

## 2023-12-31 RX ADMIN — ALBUTEROL SULFATE 2.5 MG: 2.5 SOLUTION RESPIRATORY (INHALATION) at 07:12

## 2023-12-31 RX ADMIN — ALUMINUM HYDROXIDE, MAGNESIUM HYDROXIDE, AND SIMETHICONE 30 ML: 1200; 120; 1200 SUSPENSION ORAL at 08:12

## 2024-01-01 NOTE — ED PROVIDER NOTES
Encounter Date: 12/31/2023    SCRIBE #1 NOTE: I, Blue Godfrey, am scribing for, and in the presence of,  Arsenio Quezada PA-C. I have scribed the following portions of the note - Other sections scribed: HPI, ROS, PE.       History     Chief Complaint   Patient presents with    Shortness of Breath     Patient reports shortness of breath, productive cough, smoker.  Denies PMH of chronic lung disease.      Stephanie Singh is a 33 y.o. male, with a past medical history of hyperlipidemia, who presents to the ED with SOB. Patient reports SOB, cough, and chronic epigastric abdominal pain. Patient reports that these symptoms have been going on for a few months and occur periodically. Patient states that he feels like whenever the AC or the heat is turned on his symptoms are exacerbated.  He will have increased shortness of breath and feeling that it was difficult to take a deep breath during these episodes. Patient says that it can last from a couple of days to a week. Patient denies a history of asthma. Patient states that he smokes about a pack a day. No other exacerbating or alleviating factors.    The history is provided by the patient. No  was used (Patient was offered a free and confidential  but declined.  History was not limited by a language barrier.).     Review of patient's allergies indicates:  No Known Allergies  Past Medical History:   Diagnosis Date    Gastro-esophageal reflux disease without esophagitis     Mixed hyperlipidemia 10/12/2020    Tobacco abuse      No past surgical history on file.  Family History   Problem Relation Age of Onset    No Known Problems Mother     No Known Problems Father     Hyperlipidemia Sister     No Known Problems Brother     No Known Problems Daughter     No Known Problems Son     No Known Problems Sister      Social History     Tobacco Use    Smoking status: Every Day     Current packs/day: 1.00     Average packs/day: 1 pack/day for 21.2  years (21.2 ttl pk-yrs)     Types: Cigarettes     Start date: 10/8/2002    Smokeless tobacco: Never   Substance Use Topics    Alcohol use: Yes    Drug use: No     Review of Systems   Constitutional:  Negative for diaphoresis, fatigue and unexpected weight change.   HENT:  Negative for sinus pain and sore throat.    Eyes:  Negative for pain, redness and visual disturbance.   Respiratory:  Positive for shortness of breath. Negative for cough, chest tightness and wheezing.    Cardiovascular:  Negative for chest pain and palpitations.   Gastrointestinal:  Positive for abdominal pain (Chronic). Negative for blood in stool, diarrhea, nausea and vomiting.   Endocrine: Negative for polydipsia, polyphagia and polyuria.   Genitourinary:  Negative for dysuria, frequency and urgency.   Musculoskeletal:  Negative for arthralgias, back pain and myalgias.   Skin:  Negative for rash.   Allergic/Immunologic: Negative for environmental allergies.   Neurological:  Negative for dizziness, syncope and headaches.   Psychiatric/Behavioral:  Negative for suicidal ideas.        Physical Exam     Initial Vitals [12/31/23 1745]   BP Pulse Resp Temp SpO2   125/79 82 18 98.3 °F (36.8 °C) 99 %      MAP       --         Physical Exam    Nursing note and vitals reviewed.  Constitutional: Vital signs are normal. He appears well-developed and well-nourished. He is active and cooperative. He does not appear ill. No distress.   Clinically well-appearing and in no acute distress.   HENT:   Head: Normocephalic and atraumatic.   Right Ear: External ear normal.   Left Ear: External ear normal.   Nose: Mucosal edema present.   Mouth/Throat: Uvula is midline.   Eyes: Conjunctivae and EOM are normal.   Neck: Phonation normal.   Normal range of motion.  Cardiovascular:  Normal rate, regular rhythm, S1 normal, S2 normal and normal heart sounds.           No murmur heard.  Regular rate and rhythm.  No murmurs or friction rub.   Pulmonary/Chest: Effort normal  and breath sounds normal. No accessory muscle usage. No tachypnea. No respiratory distress.   Respirations even and unlabored.  No adventitious sounds of breathing.  No wheezing.  Good air movement.   Abdominal: Abdomen is flat. He exhibits no distension. There is no abdominal tenderness.   Musculoskeletal:      Cervical back: Normal range of motion.     Neurological: He is alert and oriented to person, place, and time. GCS eye subscore is 4. GCS verbal subscore is 5. GCS motor subscore is 6.   Skin: Skin is warm and dry. Capillary refill takes less than 2 seconds. No rash noted.         ED Course   Procedures  Labs Reviewed   SARS-COV-2 RDRP GENE    Narrative:     This test utilizes isothermal nucleic acid amplification technology to detect the SARS-CoV-2 RdRp nucleic acid segment. The analytical sensitivity (limit of detection) is 500 copies/swab.     A POSITIVE result is indicative of the presence of SARS-CoV-2 RNA; clinical correlation with patient history and other diagnostic information is necessary to determine patient infection status.    A NEGATIVE result means that SARS-CoV-2 nucleic acids are not present above the limit of detection. A NEGATIVE result should be treated as presumptive. It does not rule out the possibility of COVID-19 and should not be the sole basis for treatment decisions. If COVID-19 is strongly suspected based on clinical and exposure history, re-testing using an alternate molecular assay should be considered.     This test is only for use under the Food and Drug Administration s Emergency Use Authorization (EUA).     Commercial kits are provided by [employer]. Performance characteristics of the EUA have been independently verified by [facility] Department of Pathology and Laboratory Medicine.   _________________________________________________________________   The authorized Fact Sheet for Healthcare Providers and the authorized Fact Sheet for Patients of the ID  NOW COVID-19 are available on the FDA website:    https://www.fda.gov/media/570121/download      https://www.fda.gov/media/703259/download      POCT INFLUENZA A/B MOLECULAR   POCT RAPID INFLUENZA A/B     EKG Readings: (Independently Interpreted)   Previous EKG: Compared with most recent EKG Previous EKG Date: October 3, 2023.   Normal sinus rhythm with a ventricular rate of 75 beats per minute.  All intervals within normal limits.  Compared to prior EKG, ventricular rate has decreased by 54 beats per minute.  No PVCs on this EKG.       Imaging Results    None          Medications   albuterol nebulizer solution 2.5 mg (2.5 mg Nebulization Given 12/31/23 1954)   aluminum-magnesium hydroxide-simethicone 200-200-20 mg/5 mL suspension 30 mL (30 mLs Oral Given 12/31/23 2005)     Medical Decision Making  Male presenting to the emergency department with a chief complaint of intermittent shortness of breath and chronic abdominal pain.  States that these episodes last a few days and normally happen after the weather changes or the heat or air conditioning is turned on in his home.  States it feels difficult to take a deep breath.  Currently denies any fever, chills, dyspnea, or shortness of breath.  On physical exam, he was clinically well-appearing and in no acute distress.  Vital signs are within limits.  Respirations are even unlabored.  Cardiac exam within normal limits.    Differential diagnosis includes but is not limited to respiratory infections including COVID, flu, bronchitis, rhinosinusitis, or pneumonia, or noninfectious processes such as asthma, COPD or seasonal allergies.    Patient tested negative for COVID and flu.  EKG was within normal limits.  Based on history, presentation most consistent with mild intermittent asthma.  Discussed this with the patient.  Acute management in the emergency department with 1 albuterol breathing treatment with good relief of symptoms.  Maalox for patient's chronic abdominal  pain.  Patient reported almost complete resolution of symptoms after these treatments.  Stated that he was ready to go home.  He was clinically well-appearing, no acute distress, vitals within normal limits.  Stable for discharge.  Maalox and albuterol electronically prescribed and sent to the patient's preferred pharmacy.  Urged prompt follow up with primary care provider.    Return precautions were discussed, all patient questions were answered, and the patient was agreeable to the plan of care.  He was discharged home in stable condition and will follow up with his primary care provider or return to the emergency department if his symptoms worsen or do not improve.     Amount and/or Complexity of Data Reviewed  Labs: ordered. Decision-making details documented in ED Course.  ECG/medicine tests: ordered and independent interpretation performed. Decision-making details documented in ED Course.    Risk  OTC drugs.  Prescription drug management.  Diagnosis or treatment significantly limited by social determinants of health.            Scribe Attestation:   Scribe #1: I performed the above scribed service and the documentation accurately describes the services I performed. I attest to the accuracy of the note.        ED Course as of 12/31/23 2017   Sun Dec 31, 2023   1825 EKG interpreted by Dr. Mendoza.  No STEMI.  Normal sinus rhythm, ventricular rate of 75.  Right axis.  Normal EKG.  QTC normal at 395  When compared to previous EKG completed on 10/03/2023 rate has decreased by 54 beats per minute [RF]      ED Course User Index  [RF] Vandana Mendoza DO                           Clinical Impression:  Final diagnoses:  [R06.02] SOB (shortness of breath)  [J45.909] Asthma, unspecified asthma severity, unspecified whether complicated, unspecified whether persistent (Primary)          ED Disposition Condition    Discharge Stable          ED Prescriptions       Medication Sig Dispense Start Date End Date Auth. Provider     albuterol (PROVENTIL/VENTOLIN HFA) 90 mcg/actuation inhaler Inhale 1-2 puffs into the lungs every 6 (six) hours as needed for Wheezing. Rescue 18 g 12/31/2023 12/30/2024 Arsenio Quezada PA-C    aluminum-magnesium hydroxide-simethicone (MAALOX ADVANCED) 200-200-20 mg/5 mL Susp Take 30 mLs by mouth every 6 (six) hours as needed. 769 mL 12/31/2023 12/30/2024 Arsenio Quezada PA-C          Follow-up Information       Follow up With Specialties Details Why Contact Info    Dori López MD Internal Medicine Schedule an appointment as soon as possible for a visit   [address] [place] [address]  [phone]          I, Arsenio Quezada PA-C, personally performed the services described in this documentation. All medical record entries made by the scribe were at my direction and in my presence. I have reviewed the chart and agree that the record reflects my personal performance and is accurate and complete.       Arsenio Quezada PA-C  12/31/23 2017